# Patient Record
Sex: MALE | Race: WHITE | NOT HISPANIC OR LATINO | Employment: FULL TIME | URBAN - METROPOLITAN AREA
[De-identification: names, ages, dates, MRNs, and addresses within clinical notes are randomized per-mention and may not be internally consistent; named-entity substitution may affect disease eponyms.]

---

## 2021-04-08 DIAGNOSIS — Z23 ENCOUNTER FOR IMMUNIZATION: ICD-10-CM

## 2023-04-25 ENCOUNTER — APPOINTMENT (EMERGENCY)
Dept: CT IMAGING | Facility: HOSPITAL | Age: 53
End: 2023-04-25

## 2023-04-25 ENCOUNTER — HOSPITAL ENCOUNTER (INPATIENT)
Facility: HOSPITAL | Age: 53
LOS: 2 days | Discharge: HOME | End: 2023-04-27
Attending: EMERGENCY MEDICINE | Admitting: INTERNAL MEDICINE

## 2023-04-25 ENCOUNTER — APPOINTMENT (EMERGENCY)
Dept: RADIOLOGY | Facility: HOSPITAL | Age: 53
End: 2023-04-25

## 2023-04-25 DIAGNOSIS — R77.8 ELEVATED TROPONIN: ICD-10-CM

## 2023-04-25 DIAGNOSIS — L02.212 CUTANEOUS ABSCESS OF BACK EXCLUDING BUTTOCKS: ICD-10-CM

## 2023-04-25 DIAGNOSIS — E87.1 HYPONATREMIA: ICD-10-CM

## 2023-04-25 DIAGNOSIS — L03.818 CELLULITIS OF OTHER SPECIFIED SITE: ICD-10-CM

## 2023-04-25 DIAGNOSIS — R65.20 SEPSIS WITH ACUTE ORGAN DYSFUNCTION WITHOUT SEPTIC SHOCK, DUE TO UNSPECIFIED ORGANISM, UNSPECIFIED TYPE (HCC): Primary | ICD-10-CM

## 2023-04-25 DIAGNOSIS — A41.9 SEPSIS WITH ACUTE ORGAN DYSFUNCTION WITHOUT SEPTIC SHOCK, DUE TO UNSPECIFIED ORGANISM, UNSPECIFIED TYPE (HCC): Primary | ICD-10-CM

## 2023-04-25 PROBLEM — R79.89 ELEVATED TROPONIN: Status: ACTIVE | Noted: 2023-04-25

## 2023-04-25 PROBLEM — R50.9 FEVER: Status: ACTIVE | Noted: 2023-04-25

## 2023-04-25 PROBLEM — I10 ESSENTIAL HYPERTENSION: Status: ACTIVE | Noted: 2023-04-25

## 2023-04-25 LAB
4HR DELTA HS TROPONIN: -160 NG/L
ALBUMIN SERPL BCP-MCNC: 4 G/DL (ref 3.5–5)
ALP SERPL-CCNC: 49 U/L (ref 34–104)
ALT SERPL W P-5'-P-CCNC: 32 U/L (ref 7–52)
ANION GAP SERPL CALCULATED.3IONS-SCNC: 9 MMOL/L (ref 4–13)
APTT PPP: 31 SECONDS (ref 23–37)
AST SERPL W P-5'-P-CCNC: 29 U/L (ref 13–39)
ATRIAL RATE: 108 BPM
ATRIAL RATE: 90 BPM
BACTERIA UR QL AUTO: NORMAL /HPF
BASOPHILS # BLD AUTO: 0.06 THOUSANDS/ΜL (ref 0–0.1)
BASOPHILS NFR BLD AUTO: 1 % (ref 0–1)
BILIRUB SERPL-MCNC: 0.38 MG/DL (ref 0.2–1)
BILIRUB UR QL STRIP: NEGATIVE
BUN SERPL-MCNC: 15 MG/DL (ref 5–25)
CALCIUM SERPL-MCNC: 9.7 MG/DL (ref 8.4–10.2)
CARDIAC TROPONIN I PNL SERPL HS: 305 NG/L
CARDIAC TROPONIN I PNL SERPL HS: 435 NG/L (ref 8–18)
CARDIAC TROPONIN I PNL SERPL HS: 465 NG/L
CHLORIDE SERPL-SCNC: 94 MMOL/L (ref 96–108)
CLARITY UR: CLEAR
CO2 SERPL-SCNC: 27 MMOL/L (ref 21–32)
COLOR UR: YELLOW
CREAT SERPL-MCNC: 0.99 MG/DL (ref 0.6–1.3)
EOSINOPHIL # BLD AUTO: 0.05 THOUSAND/ΜL (ref 0–0.61)
EOSINOPHIL NFR BLD AUTO: 1 % (ref 0–6)
ERYTHROCYTE [DISTWIDTH] IN BLOOD BY AUTOMATED COUNT: 12.9 % (ref 11.6–15.1)
FLUAV RNA RESP QL NAA+PROBE: NEGATIVE
FLUBV RNA RESP QL NAA+PROBE: NEGATIVE
GFR SERPL CREATININE-BSD FRML MDRD: 86 ML/MIN/1.73SQ M
GLUCOSE SERPL-MCNC: 112 MG/DL (ref 65–140)
GLUCOSE UR STRIP-MCNC: NEGATIVE MG/DL
HCT VFR BLD AUTO: 42.1 % (ref 36.5–49.3)
HGB BLD-MCNC: 14.4 G/DL (ref 12–17)
HGB UR QL STRIP.AUTO: ABNORMAL
IMM GRANULOCYTES # BLD AUTO: 0.06 THOUSAND/UL (ref 0–0.2)
IMM GRANULOCYTES NFR BLD AUTO: 1 % (ref 0–2)
INR PPP: 1.25 (ref 0.84–1.19)
KETONES UR STRIP-MCNC: NEGATIVE MG/DL
LACTATE SERPL-SCNC: 0.8 MMOL/L (ref 0.5–2)
LEUKOCYTE ESTERASE UR QL STRIP: NEGATIVE
LYMPHOCYTES # BLD AUTO: 1.32 THOUSANDS/ΜL (ref 0.6–4.47)
LYMPHOCYTES NFR BLD AUTO: 13 % (ref 14–44)
MCH RBC QN AUTO: 30.2 PG (ref 26.8–34.3)
MCHC RBC AUTO-ENTMCNC: 34.2 G/DL (ref 31.4–37.4)
MCV RBC AUTO: 88 FL (ref 82–98)
MONOCYTES # BLD AUTO: 1.48 THOUSAND/ΜL (ref 0.17–1.22)
MONOCYTES NFR BLD AUTO: 15 % (ref 4–12)
NEUTROPHILS # BLD AUTO: 7.15 THOUSANDS/ΜL (ref 1.85–7.62)
NEUTS SEG NFR BLD AUTO: 69 % (ref 43–75)
NITRITE UR QL STRIP: NEGATIVE
NON-SQ EPI CELLS URNS QL MICRO: NORMAL /HPF
NRBC BLD AUTO-RTO: 0 /100 WBCS
P AXIS: 48 DEGREES
P AXIS: 54 DEGREES
PH UR STRIP.AUTO: 5.5 [PH]
PLATELET # BLD AUTO: 311 THOUSANDS/UL (ref 149–390)
PMV BLD AUTO: 9.2 FL (ref 8.9–12.7)
POTASSIUM SERPL-SCNC: 3.5 MMOL/L (ref 3.5–5.3)
PR INTERVAL: 156 MS
PR INTERVAL: 161 MS
PROCALCITONIN SERPL-MCNC: 0.53 NG/ML
PROT SERPL-MCNC: 7.7 G/DL (ref 6.4–8.4)
PROT UR STRIP-MCNC: NEGATIVE MG/DL
PROTHROMBIN TIME: 16 SECONDS (ref 11.6–14.5)
QRS AXIS: 14 DEGREES
QRS AXIS: 25 DEGREES
QRSD INTERVAL: 76 MS
QRSD INTERVAL: 85 MS
QT INTERVAL: 296 MS
QT INTERVAL: 334 MS
QTC INTERVAL: 396 MS
QTC INTERVAL: 409 MS
RBC # BLD AUTO: 4.77 MILLION/UL (ref 3.88–5.62)
RBC #/AREA URNS AUTO: NORMAL /HPF
RSV RNA RESP QL NAA+PROBE: NEGATIVE
SARS-COV-2 RNA RESP QL NAA+PROBE: NEGATIVE
SODIUM SERPL-SCNC: 130 MMOL/L (ref 135–147)
SP GR UR STRIP.AUTO: 1.02 (ref 1–1.03)
T WAVE AXIS: 17 DEGREES
T WAVE AXIS: 39 DEGREES
UROBILINOGEN UR QL STRIP.AUTO: 0.2 E.U./DL
VENTRICULAR RATE: 108 BPM
VENTRICULAR RATE: 90 BPM
WBC # BLD AUTO: 10.12 THOUSAND/UL (ref 4.31–10.16)
WBC #/AREA URNS AUTO: NORMAL /HPF

## 2023-04-25 RX ORDER — METHOCARBAMOL 500 MG/1
500 TABLET, FILM COATED ORAL EVERY 6 HOURS PRN
Status: DISCONTINUED | OUTPATIENT
Start: 2023-04-25 | End: 2023-04-27 | Stop reason: HOSPADM

## 2023-04-25 RX ORDER — HYDROCHLOROTHIAZIDE 25 MG/1
25 TABLET ORAL DAILY
COMMUNITY

## 2023-04-25 RX ORDER — METOPROLOL SUCCINATE 50 MG/1
50 TABLET, EXTENDED RELEASE ORAL DAILY
Status: DISCONTINUED | OUTPATIENT
Start: 2023-04-26 | End: 2023-04-27 | Stop reason: HOSPADM

## 2023-04-25 RX ORDER — HEPARIN SODIUM 5000 [USP'U]/ML
5000 INJECTION, SOLUTION INTRAVENOUS; SUBCUTANEOUS EVERY 8 HOURS SCHEDULED
Status: DISCONTINUED | OUTPATIENT
Start: 2023-04-25 | End: 2023-04-27 | Stop reason: HOSPADM

## 2023-04-25 RX ORDER — WATER 1000 ML/1000ML
INJECTION, SOLUTION INTRAVENOUS
Status: COMPLETED
Start: 2023-04-25 | End: 2023-04-25

## 2023-04-25 RX ORDER — KETOROLAC TROMETHAMINE 30 MG/ML
15 INJECTION, SOLUTION INTRAMUSCULAR; INTRAVENOUS ONCE
Status: DISCONTINUED | OUTPATIENT
Start: 2023-04-25 | End: 2023-04-25

## 2023-04-25 RX ORDER — METOPROLOL SUCCINATE 50 MG/1
50 TABLET, EXTENDED RELEASE ORAL DAILY
COMMUNITY

## 2023-04-25 RX ORDER — ACETAMINOPHEN 325 MG/1
650 TABLET ORAL EVERY 6 HOURS PRN
Status: DISCONTINUED | OUTPATIENT
Start: 2023-04-25 | End: 2023-04-27 | Stop reason: HOSPADM

## 2023-04-25 RX ORDER — CEFTRIAXONE 1 G/50ML
1000 INJECTION, SOLUTION INTRAVENOUS ONCE
Status: COMPLETED | OUTPATIENT
Start: 2023-04-25 | End: 2023-04-25

## 2023-04-25 RX ORDER — HYDROCHLOROTHIAZIDE 25 MG/1
25 TABLET ORAL DAILY
Status: DISCONTINUED | OUTPATIENT
Start: 2023-04-26 | End: 2023-04-27 | Stop reason: HOSPADM

## 2023-04-25 RX ORDER — CEFTRIAXONE 2 G/50ML
2000 INJECTION, SOLUTION INTRAVENOUS EVERY 12 HOURS
Status: DISCONTINUED | OUTPATIENT
Start: 2023-04-25 | End: 2023-04-26

## 2023-04-25 RX ORDER — ACETAMINOPHEN 325 MG/1
975 TABLET ORAL ONCE
Status: COMPLETED | OUTPATIENT
Start: 2023-04-25 | End: 2023-04-25

## 2023-04-25 RX ORDER — KETOROLAC TROMETHAMINE 30 MG/ML
15 INJECTION, SOLUTION INTRAMUSCULAR; INTRAVENOUS ONCE
Status: COMPLETED | OUTPATIENT
Start: 2023-04-25 | End: 2023-04-25

## 2023-04-25 RX ADMIN — VANCOMYCIN HYDROCHLORIDE 2000 MG: 1 INJECTION, POWDER, LYOPHILIZED, FOR SOLUTION INTRAVENOUS at 17:08

## 2023-04-25 RX ADMIN — ACETAMINOPHEN 650 MG: 325 TABLET ORAL at 23:42

## 2023-04-25 RX ADMIN — IOHEXOL 100 ML: 350 INJECTION, SOLUTION INTRAVENOUS at 16:08

## 2023-04-25 RX ADMIN — ACETAMINOPHEN 975 MG: 325 TABLET ORAL at 14:14

## 2023-04-25 RX ADMIN — SODIUM CHLORIDE 1000 ML: 0.9 INJECTION, SOLUTION INTRAVENOUS at 14:04

## 2023-04-25 RX ADMIN — KETOROLAC TROMETHAMINE 15 MG: 30 INJECTION, SOLUTION INTRAMUSCULAR; INTRAVENOUS at 17:42

## 2023-04-25 RX ADMIN — WATER 10 ML: 1 INJECTION INTRAMUSCULAR; INTRAVENOUS; SUBCUTANEOUS at 23:43

## 2023-04-25 RX ADMIN — CEFTRIAXONE 1000 MG: 1 INJECTION, SOLUTION INTRAVENOUS at 16:00

## 2023-04-25 RX ADMIN — SODIUM CHLORIDE 1000 ML: 0.9 INJECTION, SOLUTION INTRAVENOUS at 15:13

## 2023-04-25 RX ADMIN — AMPICILLIN SODIUM 2000 MG: 2 INJECTION, POWDER, FOR SOLUTION INTRAMUSCULAR; INTRAVENOUS at 23:43

## 2023-04-25 NOTE — ED PROVIDER NOTES
History  Chief Complaint   Patient presents with   • Fever - 9 weeks to 74 years     Pt noticed an abscess on his back 4/8 and was prescribed Bactrim  [facility] and on 4/21 they drained it  Pt has had a fever since 4/21  PTA denies taking tylenol or motrin  This is a 23-year-old male with past medical history significant for hypertension presenting to the emergency department today for a fever ongoing for approximately 2 weeks  The patient notes that he had an abscess towards his spine that he has seen in urgent care facility for numerous times  He completed an entire course of Bactrim in addition to an incision and drainage and notes that the abscess has been improving  Though the abscess has been improving, the patient has still had a fever  The patient notes generalized myalgias associated with this  He denies any chest pain or shortness of breath  He has no dizziness, lightheadedness, or visual disturbances  He has no headache or neck pain  He denies any dysuria, hematuria, or foul-smelling urine  He does note a recent fall resulting in low back pain but notes this has been ongoing since prior to the fever  The patient does not use intravenous drugs  He denies any numbness or tingling  He does have a history of MRSA  The patient has no URI symptoms  The patient denies other complaints at this time  History provided by:  Patient   used: No    Fever - 9 weeks to 74 years  Temp source:  Oral  Severity:  Moderate  Onset quality:  Gradual  Duration: numerous weeks    Timing:  Intermittent  Progression:  Waxing and waning  Chronicity:  New  Worsened by:  Nothing  Ineffective treatments:  None tried  Associated symptoms: no chest pain, no chills, no confusion, no congestion, no cough, no diarrhea, no dysuria, no ear pain, no headaches, no myalgias, no nausea, no rash, no rhinorrhea, no somnolence, no sore throat and no vomiting Risk factors: no sick contacts        Prior to Admission Medications   Prescriptions Last Dose Informant Patient Reported? Taking?   hydrochlorothiazide (HYDRODIURIL) 25 mg tablet 4/25/2023  Yes Yes   Sig: Take 25 mg by mouth daily   metoprolol succinate (TOPROL-XL) 50 mg 24 hr tablet 4/25/2023  Yes Yes   Sig: Take 50 mg by mouth daily      Facility-Administered Medications: None       Past Medical History:   Diagnosis Date   • Hypertension        History reviewed  No pertinent surgical history  History reviewed  No pertinent family history  I have reviewed and agree with the history as documented  E-Cigarette/Vaping     E-Cigarette/Vaping Substances     Social History     Tobacco Use   • Smoking status: Never   • Smokeless tobacco: Never   Substance Use Topics   • Alcohol use: Not Currently   • Drug use: Yes     Types: Marijuana       Review of Systems   Constitutional: Positive for fever  Negative for appetite change, chills and diaphoresis  HENT: Negative for congestion, ear pain, rhinorrhea and sore throat  Respiratory: Negative for cough, chest tightness, shortness of breath and wheezing  Cardiovascular: Negative for chest pain, palpitations and leg swelling  Gastrointestinal: Negative for abdominal pain, constipation, diarrhea, nausea and vomiting  Genitourinary: Negative for dysuria  Musculoskeletal: Negative for myalgias, neck pain and neck stiffness  Skin: Negative for rash and wound  Neurological: Negative for dizziness, seizures, light-headedness, numbness and headaches  Psychiatric/Behavioral: Negative for confusion  All other systems reviewed and are negative  Physical Exam  Physical Exam  Vitals and nursing note reviewed  Constitutional:       General: He is not in acute distress  Appearance: Normal appearance  He is normal weight  He is not ill-appearing, toxic-appearing or diaphoretic  HENT:      Head: Normocephalic and atraumatic        Nose: Nose normal  No congestion or rhinorrhea  Mouth/Throat:      Mouth: Mucous membranes are moist       Pharynx: No oropharyngeal exudate or posterior oropharyngeal erythema  Eyes:      General: No scleral icterus  Right eye: No discharge  Left eye: No discharge  Conjunctiva/sclera: Conjunctivae normal    Neck:      Comments: Negative Kernig  Negative Brudzinski  Cardiovascular:      Rate and Rhythm: Normal rate and regular rhythm  Pulses: Normal pulses  Heart sounds: Normal heart sounds  No murmur heard  No friction rub  No gallop  Pulmonary:      Effort: Pulmonary effort is normal  No respiratory distress  Breath sounds: Normal breath sounds  No stridor  No wheezing, rhonchi or rales  Comments: Clear to auscultation bilaterally without adventitious breath sounds  Chest:      Chest wall: No tenderness  Abdominal:      General: Abdomen is flat  There is no distension  Palpations: Abdomen is soft  Tenderness: There is no abdominal tenderness  There is no right CVA tenderness, left CVA tenderness, guarding or rebound  Comments: The abdomen is soft, nontender, nondistended, and without organomegaly; the patient is nonperitoneal   Musculoskeletal:         General: Normal range of motion  Cervical back: Normal range of motion  No rigidity  Right lower leg: No edema  Left lower leg: No edema  Skin:     General: Skin is warm and dry  Capillary Refill: Capillary refill takes less than 2 seconds  Coloration: Skin is not jaundiced or pale  Comments: The patient has a small area of redness and swelling located to the patient's midline thoracic spine; no active drainage; appears superficial   Neurological:      General: No focal deficit present  Mental Status: He is alert and oriented to person, place, and time  Mental status is at baseline     Psychiatric:         Mood and Affect: Mood normal          Behavior: Behavior normal  Vital Signs  ED Triage Vitals   Temperature Pulse Respirations Blood Pressure SpO2   04/25/23 1331 04/25/23 1331 04/25/23 1331 04/25/23 1331 04/25/23 1331   (!) 101 9 °F (38 8 °C) (!) 115 16 144/86 96 %      Temp Source Heart Rate Source Patient Position - Orthostatic VS BP Location FiO2 (%)   04/25/23 1331 04/25/23 1427 04/25/23 1331 04/25/23 1331 --   Oral Monitor Lying Right arm       Pain Score       04/25/23 1331       No Pain           Vitals:    04/25/23 1513 04/25/23 1607 04/25/23 1629 04/25/23 1718   BP: 115/75 130/77 130/77 130/82   Pulse: 99 92 95 89   Patient Position - Orthostatic VS: Lying Sitting Sitting Sitting         Visual Acuity      ED Medications  Medications   vancomycin (VANCOCIN) 2,000 mg in sodium chloride 0 9 % 500 mL IVPB (2,000 mg Intravenous New Bag 4/25/23 1708)   sodium chloride 0 9 % bolus 1,000 mL (0 mL Intravenous Stopped 4/25/23 1512)   acetaminophen (TYLENOL) tablet 975 mg (975 mg Oral Given 4/25/23 1414)   sodium chloride 0 9 % bolus 1,000 mL (0 mL Intravenous Stopped 4/25/23 1637)   cefTRIAXone (ROCEPHIN) IVPB (premix in dextrose) 1,000 mg 50 mL (0 mg Intravenous Stopped 4/25/23 1637)   iohexol (OMNIPAQUE) 350 MG/ML injection (SINGLE-DOSE) 100 mL (100 mL Intravenous Given 4/25/23 1608)   ketorolac (TORADOL) injection 15 mg (15 mg Intravenous Given 4/25/23 1742)       Diagnostic Studies  Results Reviewed     Procedure Component Value Units Date/Time    High Sensitivity Troponin I Random [[id number]]  (Abnormal) Collected: 04/25/23 1600    Lab Status: Final result Specimen: Blood from Hand, Left Updated: 04/25/23 1636     HS TnI random 435 ng/L     Blood culture #2 [[id number]] Collected: 04/25/23 1600    Lab Status:  In process Specimen: Blood from Hand, Left Updated: 04/25/23 1610    HS Troponin I 4hr [[id number]]     Lab Status: No result Specimen: Blood     Urine Microscopic [[id number]]  (Normal) Collected: 04/25/23 1535    Lab Status: Final result Specimen: Urine, Clean Catch Updated: 04/25/23 1558     RBC, UA 0-1 /hpf      WBC, UA None Seen /hpf      Epithelial Cells Occasional /hpf      Bacteria, UA Occasional /hpf     UA w Reflex to Microscopic w Reflex to Culture [[id number]]  (Abnormal) Collected: 04/25/23 1535    Lab Status: Final result Specimen: Urine, Clean Catch Updated: 04/25/23 1542     Color, UA Yellow     Clarity, UA Clear     Specific Gravity, UA 1 025     pH, UA 5 5     Leukocytes, UA Negative     Nitrite, UA Negative     Protein, UA Negative mg/dl      Glucose, UA Negative mg/dl      Ketones, UA Negative mg/dl      Urobilinogen, UA 0 2 E U /dl      Bilirubin, UA Negative     Occult Blood, UA 2+    FLU/RSV/COVID - if FLU/RSV clinically relevant [[id number]]  (Normal) Collected: 04/25/23 1422    Lab Status: Final result Specimen: Nares from Nose Updated: 04/25/23 1526     SARS-CoV-2 Negative     INFLUENZA A PCR Negative     INFLUENZA B PCR Negative     RSV PCR Negative    Narrative:      FOR PEDIATRIC PATIENTS - copy/paste COVID Guidelines URL to browser: [email or web address]  [email or web address]    SARS-CoV-2 assay is a Nucleic Acid Amplification assay intended for the  qualitative detection of nucleic acid from SARS-CoV-2 in nasopharyngeal  swabs  Results are for the presumptive identification of SARS-CoV-2 RNA  Positive results are indicative of infection with SARS-CoV-2, the virus  causing COVID-19, but do not rule out bacterial infection or co-infection  with other viruses  Laboratories within the [place] and its  territories are required to report all positive results to the appropriate  public health authorities  Negative results do not preclude SARS-CoV-2  infection and should not be used as the sole basis for treatment or other  patient management decisions  Negative results must be combined with  clinical observations, patient history, and epidemiological information    This test has not been FDA cleared or approved  This test has been authorized by FDA under an Emergency Use Authorization  (EUA)  This test is only authorized for the duration of time the  declaration that circumstances exist justifying the authorization of the  emergency use of an in vitro diagnostic tests for detection of SARS-CoV-2  virus and/or diagnosis of COVID-19 infection under section 564(b)(1) of  the Act, 21 U  S C  520LLE-6(X)(6), unless the authorization is terminated  or revoked sooner  The test has been validated but independent review by FDA  and CLIA is pending  Test performed using [employer] GeneXpert: This RT-PCR assay targets N2,  a region unique to SARS-CoV-2  A conserved region in the E-gene was chosen  for pan-Sarbecovirus detection which includes SARS-CoV-2  According to CMS-2020-01-R, this platform meets the definition of high-throughput technology  HS Troponin 0hr (reflex protocol) [[id number]]  (Abnormal) Collected: 04/25/23 1424    Lab Status: Final result Specimen: Blood from Arm, Left Updated: 04/25/23 1454     hs TnI 0hr 465 ng/L     Protime-INR [[id number]]  (Abnormal) Collected: 04/25/23 1405    Lab Status: Final result Specimen: Blood from Arm, Left Updated: 04/25/23 1446     Protime 16 0 seconds      INR 1 25    APTT [[id number]]  (Normal) Collected: 04/25/23 1405    Lab Status: Final result Specimen: Blood from Arm, Left Updated: 04/25/23 1446     PTT 31 seconds     Procalcitonin [[id number]]  (Abnormal) Collected: 04/25/23 1405    Lab Status: Final result Specimen: Blood from Arm, Left Updated: 04/25/23 1439     Procalcitonin 0 53 ng/ml     Lactic acid [[id number]]  (Normal) Collected: 04/25/23 1405    Lab Status: Final result Specimen: Blood from Arm, Left Updated: 04/25/23 1434     LACTIC ACID 0 8 mmol/L     Narrative:      Result may be elevated if tourniquet was used during collection      Comprehensive metabolic panel [[id number]]  (Abnormal) Collected: 04/25/23 1405    Lab Status: Final result Specimen: Blood from Arm, Left Updated: 04/25/23 1431     Sodium 130 mmol/L      Potassium 3 5 mmol/L      Chloride 94 mmol/L      CO2 27 mmol/L      ANION GAP 9 mmol/L      BUN 15 mg/dL      Creatinine 0 99 mg/dL      Glucose 112 mg/dL      Calcium 9 7 mg/dL      AST 29 U/L      ALT 32 U/L      Alkaline Phosphatase 49 U/L      Total Protein 7 7 g/dL      Albumin 4 0 g/dL      Total Bilirubin 0 38 mg/dL      eGFR 86 ml/min/1 73sq m     Narrative:      National Kidney Disease Foundation guidelines for Chronic Kidney Disease (CKD):   •  Stage 1 with normal or high GFR (GFR > 90 mL/min/1 73 square meters)  •  Stage 2 Mild CKD (GFR = 60-89 mL/min/1 73 square meters)  •  Stage 3A Moderate CKD (GFR = 45-59 mL/min/1 73 square meters)  •  Stage 3B Moderate CKD (GFR = 30-44 mL/min/1 73 square meters)  •  Stage 4 Severe CKD (GFR = 15-29 mL/min/1 73 square meters)  •  Stage 5 End Stage CKD (GFR <15 mL/min/1 73 square meters)  Note: GFR calculation is accurate only with a steady state creatinine    CBC and differential [[id number]]  (Abnormal) Collected: 04/25/23 1405    Lab Status: Final result Specimen: Blood from Arm, Left Updated: 04/25/23 1412     WBC 10 12 Thousand/uL      RBC 4 77 Million/uL      Hemoglobin 14 4 g/dL      Hematocrit 42 1 %      MCV 88 fL      MCH 30 2 pg      MCHC 34 2 g/dL      RDW 12 9 %      MPV 9 2 fL      Platelets 548 Thousands/uL      nRBC 0 /100 WBCs      Neutrophils Relative 69 %      Immat GRANS % 1 %      Lymphocytes Relative 13 %      Monocytes Relative 15 %      Eosinophils Relative 1 %      Basophils Relative 1 %      Neutrophils Absolute 7 15 Thousands/µL      Immature Grans Absolute 0 06 Thousand/uL      Lymphocytes Absolute 1 32 Thousands/µL      Monocytes Absolute 1 48 Thousand/µL      Eosinophils Absolute 0 05 Thousand/µL      Basophils Absolute 0 06 Thousands/µL     Blood culture #1 [[id number]] Collected: 04/25/23 1406    Lab Status:  In process Specimen: Blood from Arm, Left Updated: 04/25/23 1409 CT spine thoracic w contrast   Final Result by Leonarda Box MD (04/25 7929)      Superior aspect of T1 vertebral body was not not fully included in the field-of-view  - No acute osseous abnormality of thoracic spine    - Tiny cutaneous abscess in midline T10 level with mild adjacent subcutaneous cellulitis  Recommend direct visualization for further evaluation    - Chronic L2 superior endplate compression fracture with mild height loss  - Additional chronic/incidental findings as detailed above  The study was marked in [facility] for immediate notification  Workstation performed: [id number]         XR chest portable    (Results Pending)              Procedures  ECG 12 Lead Documentation Only    Date/Time: 4/25/2023 2:13 PM  Performed by: Pamella Rogel PA-C  Authorized by: Pamella Rogel PA-C     Indications / Diagnosis:  Tachycardia  Patient location:  ED  Previous ECG:     Previous ECG:  Unavailable  Interpretation:     Interpretation: non-specific    Rate:     ECG rate:  108    ECG rate assessment: tachycardic    Rhythm:     Rhythm: sinus tachycardia    Ectopy:     Ectopy: none    QRS:     QRS axis:  Normal  ST segments:     ST segments:  Normal  T waves:     T waves: non-specific    Comments:      Sinus tachycardia with a rate of 108 without any evidence of STEMI  Nonspecific ST segment changes  Normal axis  No ectopy    ECG 12 Lead Documentation Only    Date/Time: 4/25/2023 4:05 PM  Performed by: Pamella Rogel PA-C  Authorized by: Pamella Rogel PA-C     Indications / Diagnosis:  Delta EKG  Patient location:  ED  Previous ECG:     Previous ECG:  Compared to current    Comparison ECG info:  4/25/2023    Similarity:  Changes noted  Interpretation:     Interpretation: normal    Rate:     ECG rate:  90    ECG rate assessment: normal    Rhythm:     Rhythm: sinus rhythm    Ectopy:     Ectopy: none    QRS:     QRS axis:  Normal  Conduction:     Conduction: normal    ST segments:     ST segments:  Normal  T waves:     T waves: non-specific    Comments:      Normal sinus rhythm with a rate of 90 without any ST segment changes or signs of ischemia  Normal axis  No ectopy  Nonspecific T wave inversions  ED Course  ED Course as of 04/25/23 1820   Tue Apr 25, 2023   1454 hs TnI 0hr(!): 465  Will discuss with cardiology  Will continue to monitor  1503 I discussed the case with Dr Yogi Gonzalez with cardiology  He reviewed the EKG  Appears to be early repolarization  Believes troponin might be elevated from sepsis  Recommends admission to trend troponins  Patient will require an echocardiogram   We will continue to monitor  1508 The 30ml/kg fluid bolus was not given to the patient despite hypotension and/or significantly elevated lactate of = 4 and/or presence of septic shock due to: Concern for fluid/volume overload  The patient will be administered 2L of crystalloid fluid instead  Orders for this have been placed in Epic  The patient may receive additional colloid or crystalloid fluids thereafter based on clinical condition  Horacio Ham PA-C   1637 HS TnI random(!): 435  Delta troponin calculates to -30                               SBIRT 20yo+    Flowsheet Row Most Recent Value   Initial Alcohol Screen: US AUDIT-C     1  How often do you have a drink containing alcohol? 0 Filed at: 04/25/2023 1331   2  How many drinks containing alcohol do you have on a typical day you are drinking? 0 Filed at: 04/25/2023 1331   3a  Male UNDER 65: How often do you have five or more drinks on one occasion? 0 Filed at: 04/25/2023 1331   3b  FEMALE Any Age, or MALE 65+: How often do you have 4 or more drinks on one occassion? 0 Filed at: 04/25/2023 1331   Audit-C Score 0 Filed at: 04/25/2023 1331   FRANCISCO JAVIER: How many times in the past year have you        Used an illegal drug or used a prescription medication for non-medical reasons? Never Filed at: 04/25/2023 2671                    Medical Decision Making  This is a 59-year-old male presenting to the emergency department today for an unremitting fever  The patient had an abscess drained a few weeks ago and has completed an entire course of Bactrim  He denies chest pain or shortness of breath  He denies any neck pain  Vital signs initially show fever and tachycardia  The patient meets SIRS criteria  On physical examination, the patient's abdomen is soft, nontender, nondistended, and without organomegaly  The patient's lungs are clear to auscultation bilaterally without adventitious breath sounds  The patient has a negative Kernig and Brudzinski; the patient's examination is nonmeningeal   The patient has a small area of superficial cellulitis located to his midline thoracic spine  The patient has no leukocytosis but does have an elevated procalcitonin  Initial EKG shows sinus tachycardia with a rate of 108  Initial troponin is 465  I discussed the case and shared the EKG with Dr Niki Rogel with cardiology; he believes it is likely in the setting of sepsis but does not appear to be a STEMI at this time  Mild hyponatremia of 130  Elevated procalcitonin  The patient was given 2 L of fluid in addition to Rocephin and vancomycin while here in the emergency department  Delta EKG shows normal sinus rhythm with a rate of 90  After 2 L of intravenous fluids, the patient's heart rate began to normalize  The patient was given antipyretics while here in the emergency department  CT thoracic spine with contrast shows tiny cutaneous abscess in the midline T10 level with mild adjacent subcutaneous cellulitis  Negative urine studies  Based upon sepsis without origin, will plan for admission for intravenous antibiotics  The patient was accepted to the service of Dr Nena Horan  CXR shows no acute cardiopulmonary disease on my independent interpretation    Strict "return precautions were given  Recommend PCP follow-up as soon as possible  The patient and/or patient's proxy verify their understanding and agree to the plan at this time  All questions answered to the patient and/or their proxy's satisfaction  All labs reviewed and utilized in the medical decision making process (if labs were ordered)  Portions of the record may have been created with voice recognition software   Occasional wrong word or \"sound a like\" substitutions may have occurred due to the inherent limitations of voice recognition software   Read the chart carefully and recognize, using context, where substitutions have occurred  Cellulitis of other specified site: complicated acute illness or injury  Hyponatremia: complicated acute illness or injury  Sepsis with acute organ dysfunction without septic shock, due to unspecified organism, unspecified type [facility]): complicated acute illness or injury  Amount and/or Complexity of Data Reviewed  Labs: ordered  Decision-making details documented in ED Course  Radiology: ordered  Decision-making details documented in ED Course  Details: CXR  ECG/medicine tests: ordered  Decision-making details documented in ED Course  Discussion of management or test interpretation with external provider(s): Dr Rama Cornejo - Cardiology  Dr Janneth Ramirez drugs  Prescription drug management  Decision regarding hospitalization            Disposition  Final diagnoses:   Sepsis with acute organ dysfunction without septic shock, due to unspecified organism, unspecified [facility] )   Hyponatremia   Cellulitis of other specified site     Time reflects when diagnosis was documented in both MDM as applicable and the Disposition within this note     Time User Action Codes Description Comment    4/25/2023  5:16 PM Bert Ham Add [A41 9,  O11 20] Sepsis with acute organ dysfunction without septic shock, due to unspecified organism, unspecified type ([facility] ) " 4/25/2023  5:16 PM Jailyn Ham Add [E87 1] Hyponatremia     4/25/2023  5:17 PM Jailyn Ham Add [J75 270] Cellulitis of other specified site       ED Disposition     ED Disposition   Admit    Condition   Stable    Date/Time   Tue Apr 25, 2023  5:16 PM    Comment   Case was discussed with Dr Nai Garcia and the patient's admission status was agreed to be Admission Status: inpatient status to the service of Dr Nai Garcia  Follow-up Information    None         Current Discharge Medication List      CONTINUE these medications which have NOT CHANGED    Details   hydrochlorothiazide (HYDRODIURIL) 25 mg tablet Take 25 mg by mouth daily      metoprolol succinate (TOPROL-XL) 50 mg 24 hr tablet Take 50 mg by mouth daily             No discharge procedures on file      PDMP Review     None          ED Provider  Electronically Signed by           Mandi Apodaca PA-C  04/25/23 9657

## 2023-04-26 ENCOUNTER — APPOINTMENT (INPATIENT)
Dept: NON INVASIVE DIAGNOSTICS | Facility: HOSPITAL | Age: 53
End: 2023-04-26

## 2023-04-26 PROBLEM — A41.9 SEPSIS (HCC): Status: ACTIVE | Noted: 2023-04-25

## 2023-04-26 LAB
ANION GAP SERPL CALCULATED.3IONS-SCNC: 9 MMOL/L (ref 4–13)
AORTIC ROOT: 3.3 CM
APICAL FOUR CHAMBER EJECTION FRACTION: 44 %
BASOPHILS # BLD AUTO: 0.03 THOUSANDS/ΜL (ref 0–0.1)
BASOPHILS NFR BLD AUTO: 0 % (ref 0–1)
BUN SERPL-MCNC: 13 MG/DL (ref 5–25)
CALCIUM SERPL-MCNC: 8.4 MG/DL (ref 8.4–10.2)
CHLORIDE SERPL-SCNC: 100 MMOL/L (ref 96–108)
CO2 SERPL-SCNC: 25 MMOL/L (ref 21–32)
CREAT SERPL-MCNC: 0.74 MG/DL (ref 0.6–1.3)
E WAVE DECELERATION TIME: 199 MS
EOSINOPHIL # BLD AUTO: 0.13 THOUSAND/ΜL (ref 0–0.61)
EOSINOPHIL NFR BLD AUTO: 2 % (ref 0–6)
ERYTHROCYTE [DISTWIDTH] IN BLOOD BY AUTOMATED COUNT: 12.9 % (ref 11.6–15.1)
FRACTIONAL SHORTENING: 35 (ref 28–44)
GFR SERPL CREATININE-BSD FRML MDRD: 105 ML/MIN/1.73SQ M
GLUCOSE SERPL-MCNC: 99 MG/DL (ref 65–140)
HCT VFR BLD AUTO: 38.2 % (ref 36.5–49.3)
HGB BLD-MCNC: 12.8 G/DL (ref 12–17)
IMM GRANULOCYTES # BLD AUTO: 0.03 THOUSAND/UL (ref 0–0.2)
IMM GRANULOCYTES NFR BLD AUTO: 0 % (ref 0–2)
INTERVENTRICULAR SEPTUM IN DIASTOLE (PARASTERNAL SHORT AXIS VIEW): 1.3 CM
INTERVENTRICULAR SEPTUM: 1.3 CM (ref 0.6–1.1)
LAAS-AP2: 19.1 CM2
LAAS-AP4: 16.9 CM2
LEFT ATRIUM SIZE: 3.7 CM
LEFT INTERNAL DIMENSION IN SYSTOLE: 2.8 CM (ref 2.1–4)
LEFT VENTRICLE DIASTOLIC VOLUME (MOD BIPLANE): 119 ML
LEFT VENTRICLE SYSTOLIC VOLUME (MOD BIPLANE): 58 ML
LEFT VENTRICULAR INTERNAL DIMENSION IN DIASTOLE: 4.3 CM (ref 3.5–6)
LEFT VENTRICULAR POSTERIOR WALL IN END DIASTOLE: 1.2 CM
LEFT VENTRICULAR STROKE VOLUME: 52 ML
LV EF: 51 %
LVSV (TEICH): 52 ML
LYMPHOCYTES # BLD AUTO: 1.46 THOUSANDS/ΜL (ref 0.6–4.47)
LYMPHOCYTES NFR BLD AUTO: 19 % (ref 14–44)
MCH RBC QN AUTO: 29.8 PG (ref 26.8–34.3)
MCHC RBC AUTO-ENTMCNC: 33.5 G/DL (ref 31.4–37.4)
MCV RBC AUTO: 89 FL (ref 82–98)
MONOCYTES # BLD AUTO: 1.4 THOUSAND/ΜL (ref 0.17–1.22)
MONOCYTES NFR BLD AUTO: 18 % (ref 4–12)
MV E'TISSUE VEL-SEP: 10 CM/S
MV PEAK A VEL: 0.92 M/S
MV PEAK E VEL: 73 CM/S
MV STENOSIS PRESSURE HALF TIME: 58 MS
MV VALVE AREA P 1/2 METHOD: 3.79
NEUTROPHILS # BLD AUTO: 4.72 THOUSANDS/ΜL (ref 1.85–7.62)
NEUTS SEG NFR BLD AUTO: 61 % (ref 43–75)
NRBC BLD AUTO-RTO: 0 /100 WBCS
PLATELET # BLD AUTO: 265 THOUSANDS/UL (ref 149–390)
PMV BLD AUTO: 8.9 FL (ref 8.9–12.7)
POTASSIUM SERPL-SCNC: 3.5 MMOL/L (ref 3.5–5.3)
RBC # BLD AUTO: 4.29 MILLION/UL (ref 3.88–5.62)
RIGHT ATRIUM AREA SYSTOLE A4C: 12.9 CM2
RIGHT VENTRICLE ID DIMENSION: 3.3 CM
SL CV LEFT ATRIUM LENGTH A2C: 5.1 CM
SL CV LV EF: 55
SL CV PED ECHO LEFT VENTRICLE DIASTOLIC VOLUME (MOD BIPLANE) 2D: 82 ML
SL CV PED ECHO LEFT VENTRICLE SYSTOLIC VOLUME (MOD BIPLANE) 2D: 30 ML
SODIUM SERPL-SCNC: 134 MMOL/L (ref 135–147)
TRICUSPID ANNULAR PLANE SYSTOLIC EXCURSION: 1.8 CM
WBC # BLD AUTO: 7.77 THOUSAND/UL (ref 4.31–10.16)

## 2023-04-26 RX ORDER — WATER 1000 ML/1000ML
INJECTION, SOLUTION INTRAVENOUS
Status: COMPLETED
Start: 2023-04-26 | End: 2023-04-26

## 2023-04-26 RX ORDER — VANCOMYCIN HYDROCHLORIDE 1 G/200ML
1000 INJECTION, SOLUTION INTRAVENOUS EVERY 8 HOURS
Status: DISCONTINUED | OUTPATIENT
Start: 2023-04-26 | End: 2023-04-27

## 2023-04-26 RX ADMIN — VANCOMYCIN HYDROCHLORIDE 750 MG: 750 INJECTION, SOLUTION INTRAVENOUS at 12:56

## 2023-04-26 RX ADMIN — METOPROLOL SUCCINATE 50 MG: 50 TABLET, EXTENDED RELEASE ORAL at 08:07

## 2023-04-26 RX ADMIN — AMPICILLIN SODIUM 2000 MG: 2 INJECTION, POWDER, FOR SOLUTION INTRAMUSCULAR; INTRAVENOUS at 08:07

## 2023-04-26 RX ADMIN — CEFTRIAXONE 2000 MG: 2 INJECTION, SOLUTION INTRAVENOUS at 04:38

## 2023-04-26 RX ADMIN — VANCOMYCIN HYDROCHLORIDE 750 MG: 750 INJECTION, SOLUTION INTRAVENOUS at 05:42

## 2023-04-26 RX ADMIN — VANCOMYCIN HYDROCHLORIDE 1000 MG: 1 INJECTION, SOLUTION INTRAVENOUS at 20:02

## 2023-04-26 RX ADMIN — AMPICILLIN SODIUM 2000 MG: 2 INJECTION, POWDER, FOR SOLUTION INTRAMUSCULAR; INTRAVENOUS at 11:44

## 2023-04-26 RX ADMIN — WATER 10 ML: 1 INJECTION INTRAMUSCULAR; INTRAVENOUS; SUBCUTANEOUS at 03:49

## 2023-04-26 RX ADMIN — AMPICILLIN SODIUM 2000 MG: 2 INJECTION, POWDER, FOR SOLUTION INTRAMUSCULAR; INTRAVENOUS at 03:50

## 2023-04-26 RX ADMIN — HYDROCHLOROTHIAZIDE 25 MG: 25 TABLET ORAL at 08:07

## 2023-04-26 NOTE — CONSULTS
Consultation - Infectious Disease   Francisco Fiore  47 y o  male MRN: [id number]  Unit/Bed#: -01 Encounter: [id number]      IMPRESSION & RECOMMENDATIONS:   1  SIRS vs Sepsis, POA with fever and tachycardia  In setting of patient developing nightly fever/night sweats starting 4/20 on D4/5 of Bactrim for #2  Patient discontinued Bactrim 4/23/23  Admission blood cultures pending  Low grade temp/less sweats over night   -antibiotics as below  -follow-up blood cultures  -monitor temperature and hemodynamics  -serial exam  -recheck CBC and CMP in a m  2  MRSA Thoracic back abscess  S/p lidocaine injection followed by spontaneous drainage 4/21/23 at [facility] with wound culture MRSA sensitive to tetracycline  H/O MRSA boils 2022  Interested in decolonization regimen  No tick exposure  Lives in [place], visiting mom here in [place] since [facility]  4/25/23 CT thoracic spine with tiny cutaneous abscess at T10 level with mild adjacent subc cellulitis  -continue IV Vancomycin pending blood cultures  -Vancomycin dosed per pharmacy  -anticipate transition to Doxycycline to complete 1 week course from spontaneous drainage opening  -serial exam  -if loses IV access, can transition to Doxycycline     3  MRSA boils recurrence since 2022  MRSA Colonization  Risk factor for recurrent infection  Recommend protocol as noted below as outpatient:  -patient interested in MRSA decolonization regimen upon discharge  1  Recommend applying mupirocin to anterior nares twice daily x 5 days   2  Recommend use of hibiclens bath every other day x 1-2 weeks and then decrease to twice weekly   3  Recommend use of bleach solution to wipe down bathroom/kitchen surfaces twice the first week and then weekly for 1 month  4  Recommend laundering bedding and clothing  5  Recommend not shaving skin in armpit, groin or legs and if he must shave, to first wash with Hibiclens and then use a fresh disposable razor for each shaving  6   Recommend hygiene measures including keeping fingernails cut short and clean; changing underwear, sleep wear, washcloths, and towels daily    4  Possible Bactrim Drug fever  Monitor on Vancomycin  Has tolerated Doxycycline for #3 prior  -avoid Bactrim  -monitor antibiotic tolerance    Antibiotics:  Vancomycin D2    I have discussed the above management plan in detail with patient, RN, [facility] RN, and Dr Hodan Funez of the primary service  All of questions answered and reassurance given  Extensive review of the medical records in epic including review of the notes, radiographs, and laboratory results     HISTORY OF PRESENT ILLNESS:  Reason for Consult: 1  Sepsis 2  Fever on Bactrim    HPI: Pako Rosales  is a 47y o  year old male with HTN, MRSA recurrent boils who presents with persistent fevers  He went to [facility] 4/16 for back abscess and was prescribed Bactrim  On 4/20 he started with fever/night sweats daily  Then at [facility] return visit s/p lidocaine injection followed by spontaneous drainage 4/21/23  Fevers continued and he discontinued Bactrim after 4/23/23 doses  He then presented 4/25/23 with fever and associated headache with photophobia or phonophobia  In the ED, patient with fever and tachycardia  Blood cultures obtained  Patient started on IV vancomycin and Rocephin and given fluid bolus with improvement  Underwent CT of the thoracic spine revealing tiny cutaneous abscess in the midline T10 level with mild adjacent subcutaneous cellulitis  Patient was admitted on IV antibiotics and further evaluation by Infectious disease for evaluation and management of Sepsis, fever on Bactrim  He is feeling much better  At [facility] wound culture has now grown MRSA sensitive to tetracycline  H/O MRSA boils 2022  He denies no neck pain, shortness of breath, abdominal pain, nausea/vomiting/diarrhea, urinary complaints  No other lesions on the skin  No tick exposure   Lives in [place], visiting mom here in [place] since [facility]              REVIEW OF SYSTEMS:  A complete review of systems is negative other than that noted in the HPI  PAST MEDICAL HISTORY:  Past Medical History:   Diagnosis Date   • Hypertension      History reviewed  No pertinent surgical history  FAMILY HISTORY:  Non-contributory    SOCIAL HISTORY:  Social History   Social History     Substance and Sexual Activity   Alcohol Use Not Currently     Social History     Substance and Sexual Activity   Drug Use Yes   • Types: Marijuana     Social History     Tobacco Use   Smoking Status Never   Smokeless Tobacco Never       ALLERGIES:  No Known Allergies    MEDICATIONS:  All current active medications have been reviewed  PHYSICAL EXAM:  Temp:  [97 3 °F (36 3 °C)-101 9 °F (38 8 °C)] 99 2 °F (37 3 °C)  HR:  [] 94  Resp:  [16-20] 20  BP: (106-144)/(67-92) 117/74  SpO2:  [94 %-99 %] 95 %  Temp (24hrs), Av 3 °F (37 4 °C), Min:97 3 °F (36 3 °C), Max:101 9 °F (38 8 °C)  Current: Temperature: 99 2 °F (37 3 °C)    Intake/Output Summary (Last 24 hours) at 2023 1303  Last data filed at 2023 0100  Gross per 24 hour   Intake 473 ml   Output --   Net 473 ml       General Appearance:  47year old male, ambulatory in room, feeling much better, appearing well, nontoxic, and in no distress   Head:  Normocephalic, without obvious abnormality, atraumatic   Eyes:  Conjunctiva pink and sclera anicteric, both eyes   Nose: Nares normal, mucosa normal, no drainage   Throat: Oropharynx moist without lesions   Neck: Supple, symmetrical, no adenopathy, no tenderness/mass/nodules, no nuchal rigidity    Back:   Symmetric, no curvature, ROM normal, no CVA tenderness   Lungs:   Clear to auscultation bilaterally, respirations unlabored   Chest Wall:  No tenderness or deformity   Heart:  RRR; no murmur, rub or gallop   Abdomen:   Soft, non-tender, protuberant, positive bowel sounds    Extremities: No cyanosis, clubbing or edema   Skin: No rashes or lesions   No draining wounds noted  Lymph nodes: Cervical, supraclavicular nodes normal   Neurologic: Alert and oriented times 3, extremity strength 5/5 and symmetric       LABS, IMAGING, & OTHER STUDIES:  Lab Results:  I have personally reviewed pertinent labs  Results from last 7 days   Lab Units 04/26/23  0506 04/25/23  1405   WBC Thousand/uL 7 77 10 12   HEMOGLOBIN g/dL 12 8 14 4   PLATELETS Thousands/uL 265 311     Results from last 7 days   Lab Units 04/26/23  0506 04/25/23  1405   SODIUM mmol/L 134* 130*   POTASSIUM mmol/L 3 5 3 5   CHLORIDE mmol/L 100 94*   CO2 mmol/L 25 27   BUN mg/dL 13 15   CREATININE mg/dL 0 74 0 99   EGFR ml/min/1 73sq m 105 86   CALCIUM mg/dL 8 4 9 7   AST U/L  --  29   ALT U/L  --  32   ALK PHOS U/L  --  49     Results from last 7 days   Lab Units 04/25/23  1600 04/25/23  1406   BLOOD CULTURE  Received in Microbiology Lab  Culture in Progress  Received in Microbiology Lab  Culture in Progress  Results from last 7 days   Lab Units 04/25/23  1405   PROCALCITONIN ng/ml 0 53*                   Imaging Studies:   Images in PACS reviewed personally  4/25/23 CT thoracic spine with tiny cutaneous abscess at T10 level with mild adjacent subc cellulitis  Other Studies:   I have personally reviewed pertinent reports

## 2023-04-26 NOTE — PROGRESS NOTES
[facility]  65   Progress Note  Name: Aleah Novak I  MRN: [id number]  Unit/Bed#: -01 I Date of Admission: 4/25/2023   Date of Service: 4/26/2023 I Hospital Day: 1    Assessment/Plan   Elevated troponin  Assessment & Plan  · Hs trop [id number]  · Likely non-ischemic myocardial injury in setting of sepsis  · Trended flat  ·  ED discussed with cards who  recommended echo - EF 55%, no regional wall abnormalities, no vegitations  · Telemetry    Cutaneous abscess of back excluding buttocks  Assessment & Plan  · Presenting with cutaneous abscess of back refractory to Bactrim  · CT thoracic spine revealing tiny cutaneous abscess with surrounding mild cellulitis   · Abx as below   · Analgesia p r n  Essential hypertension  Assessment & Plan  · Continue HCTZ, metoprolol  · BP is 117/74    * Sepsis ([address] )  Assessment & Plan  · Background: Presenting with cutaneous abscess of his back treated with I&D + Bactrim  Fevers initially improved, however now with persistent high fevers since 4/21  Reports hx MRSA skin infections  Also complaining of headaches, neck pain  · WBC 10 12  Procal 0 53  Lactic 0 8  Recorded Tmax 101 8  · CT thoracic spine: tiny cutaneous abscess in midline T10 level with mild adjacent subcutaneous cellulitis   · Source: likely cellulitis  Given symptoms, some concern for CNS infection  However lower given clinical picture  · On exam with neck pain w/ flexion  Negative Kernig/Brudzinksi  GCS 15 wo focal deficit   · Started on Rocephin + Vancomycin in ED  Continue with Rocephin, Vancomycin, ampicillin   · Neuro checks   · ID consult appreciated - patient still receiving abx   I would continue these until cultures negative for 48 hours, however will defer to ID who have seen the patient and currently working on plan  · Follow up on blood cultures  · Trend WBCS/fever curve/procal                 VTE Pharmacologic Prophylaxis: VTE Score: 3 Moderate Risk (Score 3-4) - "Pharmacological DVT Prophylaxis Ordered: heparin  Patient Centered Rounds: I performed bedside rounds with nursing staff today  Discussions with Specialists or Other Care Team Provider: Discussed with ID - they will advise regarding continued abx  Education and Discussions with Family / Patient: Called mother Ugo Morataya -   no answer  Unable to leave voicemail       Total Time Spent on Date of Encounter in care of patient: 30 min  This time was spent on one or more of the following: performing physical exam; counseling and coordination of care; obtaining or reviewing history; documenting in the medical record; reviewing/ordering tests, medications or procedures; communicating with other healthcare professionals and discussing with patient's family/caregivers  Current Length of Stay: 1 day(s)  Current Patient Status: Inpatient   Certification Statement: The patient will continue to require additional inpatient hospital stay due to monitor for fevers, follow up cultures  Discharge Plan: Anticipate discharge in 24-48 hrs to home  Code Status: Level 1 - Full Code    Subjective:   Patient seen and examined   Feeling \"okay\"    Objective:     Vitals:   Temp (24hrs), Av 3 °F (37 4 °C), Min:97 3 °F (36 3 °C), Max:101 9 °F (38 8 °C)    Temp:  [97 3 °F (36 3 °C)-101 9 °F (38 8 °C)] 99 2 °F (37 3 °C)  HR:  [] 94  Resp:  [16-20] 20  BP: (106-144)/(67-92) 117/74  SpO2:  [94 %-99 %] 95 %  Body mass index is 39 94 kg/m²  Input and Output Summary (last 24 hours): Intake/Output Summary (Last 24 hours) at 2023 1308  Last data filed at 2023 0100  Gross per 24 hour   Intake 473 ml   Output --   Net 473 ml       Physical Exam:   Physical Exam  Constitutional:       General: He is not in acute distress  Appearance: He is not ill-appearing or toxic-appearing  HENT:      Head: Normocephalic  Eyes:      Pupils: Pupils are equal, round, and reactive to light     Cardiovascular:      Rate and " Rhythm: Normal rate  Heart sounds: No murmur heard  No friction rub  No gallop  Pulmonary:      Effort: No respiratory distress  Breath sounds: No stridor  No wheezing, rhonchi or rales  Chest:      Chest wall: No tenderness  Abdominal:      General: There is no distension  Palpations: There is no mass  Tenderness: There is no abdominal tenderness  There is no right CVA tenderness, left CVA tenderness, guarding or rebound  Hernia: No hernia is present  Musculoskeletal:         General: No swelling, tenderness, deformity or signs of injury  Right lower leg: No edema  Skin:     Capillary Refill: Capillary refill takes less than 2 seconds  Coloration: Skin is pale  Skin is not jaundiced  Findings: No bruising, lesion or rash  Neurological:      General: No focal deficit present  Mental Status: He is alert  Cranial Nerves: No cranial nerve deficit  Sensory: No sensory deficit  Motor: No weakness        Coordination: Coordination normal       Gait: Gait normal       Deep Tendon Reflexes: Reflexes normal    Psychiatric:         Mood and Affect: Mood normal           Additional Data:     Labs:  Results from last 7 days   Lab Units 04/26/23  0506   WBC Thousand/uL 7 77   HEMOGLOBIN g/dL 12 8   HEMATOCRIT % 38 2   PLATELETS Thousands/uL 265   NEUTROS PCT % 61   LYMPHS PCT % 19   MONOS PCT % 18*   EOS PCT % 2     Results from last 7 days   Lab Units 04/26/23  0506 04/25/23  1405   SODIUM mmol/L 134* 130*   POTASSIUM mmol/L 3 5 3 5   CHLORIDE mmol/L 100 94*   CO2 mmol/L 25 27   BUN mg/dL 13 15   CREATININE mg/dL 0 74 0 99   ANION GAP mmol/L 9 9   CALCIUM mg/dL 8 4 9 7   ALBUMIN g/dL  --  4 0   TOTAL BILIRUBIN mg/dL  --  0 38   ALK PHOS U/L  --  49   ALT U/L  --  32   AST U/L  --  29   GLUCOSE RANDOM mg/dL 99 112     Results from last 7 days   Lab Units 04/25/23  1405   INR  1 25*             Results from last 7 days   Lab Units 04/25/23  1405   LACTIC ACID mmol/L 0 8   PROCALCITONIN ng/ml 0 53*       Lines/Drains:  Invasive Devices     Peripheral Intravenous Line  Duration           Peripheral IV 04/25/23 Left Antecubital <1 day                      Imaging: No pertinent imaging reviewed  Recent Cultures (last 7 days):   Results from last 7 days   Lab Units 04/25/23  1600 04/25/23  1406   BLOOD CULTURE  Received in Microbiology Lab  Culture in Progress  Received in Microbiology Lab  Culture in Progress  Last 24 Hours Medication List:   Current Facility-Administered Medications   Medication Dose Route Frequency Provider Last Rate   • acetaminophen  650 mg Oral Q6H PRN Tory Holder PA-C     • heparin (porcine)  5,000 Units Subcutaneous [facility] Tory Holder PA-C     • hydrochlorothiazide  25 mg Oral Daily Tory Holder PA-C     • methocarbamol  500 mg Oral Q6H PRN Melissa Ferris PA-C     • metoprolol succinate  50 mg Oral Daily Tory Holder PA-C          Today, Patient Was Seen By: Valerie Johnson MD    **Please Note: This note may have been constructed using a voice recognition system  **

## 2023-04-26 NOTE — ASSESSMENT & PLAN NOTE
· Hs trop 106>821>723  · Likely non-ischemic myocardial injury in setting of sepsis  · Trended flat  ·  ED discussed with cards who  recommended echo - EF 55%, no regional wall abnormalities, no vegitations  · Telemetry

## 2023-04-26 NOTE — ASSESSMENT & PLAN NOTE
· Background: Presenting with cutaneous abscess of his back treated with I&D + Bactrim  Fevers initially improved, however now with persistent high fevers since 4/21  Reports hx MRSA skin infections  Also complaining of headaches, neck pain  · WBC 10 12  Procal 0 53  Lactic 0 8  Recorded Tmax 101 8  · CT thoracic spine: tiny cutaneous abscess in midline T10 level with mild adjacent subcutaneous cellulitis   · Source: likely cellulitis  Given symptoms, some concern for CNS infection  However lower given clinical picture  · On exam with neck pain w/ flexion  Negative Kernig/Brudzinksi  GCS 15 wo focal deficit   · Started on Rocephin + Vancomycin in ED   Continue with Rocephin, Vancomycin, ampicillin   · Neuro checks   · ID consult appreciated   · Follow up on blood cultures  · Trend WBCS/fever curve/procal

## 2023-04-26 NOTE — ASSESSMENT & PLAN NOTE
· Hs trop 183>214>967  · Likely non-ischemic myocardial injury in setting of sepsis  · Trended flat  · Cardiology consulted in ED recommending echo - ordered  · Telemetry

## 2023-04-26 NOTE — UTILIZATION REVIEW
Initial Clinical Review    Admission: Date/Time/Statement:   Admission Orders (From admission, onward)     Ordered        04/25/23 1717  INPATIENT ADMISSION  Once                      Orders Placed This Encounter   Procedures   • INPATIENT ADMISSION     Standing Status:   Standing     Number of Occurrences:   1     Order Specific Question:   Level of Care     Answer:   Med Surg [16]     Order Specific Question:   Estimated length of stay     Answer:   More than 2 Midnights     Order Specific Question:   Certification     Answer:   I certify that inpatient services are medically necessary for this patient for a duration of greater than two midnights  See H&P and MD Progress Notes for additional information about the patient's course of treatment  ED Arrival Information     Expected   -    Arrival   4/25/2023 13:22    Acuity   Urgent            Means of arrival   Walk-In    Escorted by   Self    Service   Hospitalist    Admission type   Emergency            Arrival complaint   fever            Chief Complaint   Patient presents with   • Fever - 9 weeks to 74 years     Pt noticed an abscess on his back 4/8 and was prescribed Bactrim  [facility] and on 4/21 they drained it  Pt has had a fever since 4/21  PTA denies taking tylenol or motrin  Initial Presentation: 47 y o  male with PMHx of HTN, MRSA presents to ED as a walk-in with persistent fevers ongoing for ~ 2 wks  Pt seen at urgent care and tx'd for a subcutaneous abscess on his back by I&D and started on bactrim  Fevers initially improved, however now with persistent high fevers since 4/21  Reports hx MRSA skin infections  Also c/o headaches, neck pain  In ED WBC 10 12  Procal 0 53  Lactic 0 8  Recorded Tmax 101 8  CT thoracic spine: tiny cutaneous abscess in midline T10 level with mild adjacent subcutaneous cellulitis  Tachycardic  Started on Rocephin and Vanco in ED    ADMIT INPATIENT to M/S UNIT with SEPSIS, CUTANEOUS ABSCESS OF BACK -- continue with Rocephin, Vancomycin, ampicillin  Neuro checks  ID consulted  Blood cxs pending  Trend WBCs/gever curve  Analgesics prn  Telemetry  Continue pta HCTZ, metoprolol  Date: 4/26   Day 2: Marisabel Huang 146>417>397, likely non-ischemic myocardial injury in setting of sepsis  Echo with EF 55%, no regional wall abnormalities, no vegetations  Continue IV abx per ID  Supportive care  ID consult -- SIRS vs Sepsis, MRSA Thoracic back abscess  S/p lidocaine injection followed by spontaneous drainage 4/21/23 at [facility] with wound culture MRSA sensitive to tetracycline, H O MRSA bois recurrence since 2022  continue IV Vancomycin pending blood cultures  Vancomycin dosed per pharmacy  Anticipate transition to Doxycycline to complete 1 wk course from spontaneous drainage opening  Serial exams  F/u blood cxs   Recheck CBC, CMP in AM      ED Triage Vitals   Temperature Pulse Respirations Blood Pressure SpO2   04/25/23 1331 04/25/23 1331 04/25/23 1331 04/25/23 1331 04/25/23 1331   (!) 101 9 °F (38 8 °C) (!) 115 16 144/86 96 %      Temp Source Heart Rate Source Patient Position - Orthostatic VS BP Location FiO2 (%)   04/25/23 1331 04/25/23 1427 04/25/23 1331 04/25/23 1331 --   Oral Monitor Lying Right arm       Pain Score       04/25/23 1331       No Pain          Wt Readings from Last 1 Encounters:   04/26/23 116 kg (255 lb)     Additional Vital Signs:  Date/Time Temp Pulse Resp BP SpO2 O2 Device Patient Position - Orthostatic VS   04/26/23 0700 99 2 °F (37 3 °C) 94 20 117/74 95 % None (Room air) --   04/26/23 0300 97 7 °F (36 5 °C) -- -- -- -- -- --   04/25/23 2329 100 6 °F (38 1 °C) Abnormal  99 20 121/92 95 % -- Lying   04/25/23 1938 -- -- -- -- -- None (Room air) --   04/25/23 1825 97 3 °F (36 3 °C) Abnormal  85 16 121/75 95 % -- Lying   04/25/23 1718 98 4 °F (36 9 °C) 89 18 130/82 99 % None (Room air) Sitting   04/25/23 1629 -- 95 18 130/77 97 % None (Room air) Sitting   04/25/23 1607 -- 92 18 130/77 96 % None (Room air) Sitting   04/25/23 1513 99 7 °F (37 6 °C) 99 18 115/75 94 % None (Room air) Lying   04/25/23 1427 -- 105 18 106/67 96 % None (Room air) Sitting   04/25/23 1331 101 9 °F (38 8 °C) Abnormal  115 Abnormal  16 144/86 96 % None (Room air) Lying     Pertinent Labs/Diagnostic Test Results:   CT spine thoracic w contrast   Final Result by Sadie Woodard MD (04/25 0848)      Superior aspect of T1 vertebral body was not not fully included in the field-of-view  - No acute osseous abnormality of thoracic spine    - Tiny cutaneous abscess in midline T10 level with mild adjacent subcutaneous cellulitis  Recommend direct visualization for further evaluation    - Chronic L2 superior endplate compression fracture with mild height loss  - Additional chronic/incidental findings as detailed above  XR chest portable   Final Result by Rowena Sol MD (04/26 4817)      No acute cardiopulmonary disease          Results from last 7 days   Lab Units 04/25/23  1422   SARS-COV-2  Negative     Results from last 7 days   Lab Units 04/26/23  0506 04/25/23  1405   WBC Thousand/uL 7 77 10 12   HEMOGLOBIN g/dL 12 8 14 4   HEMATOCRIT % 38 2 42 1   PLATELETS Thousands/uL 265 311   NEUTROS ABS Thousands/µL 4 72 7 15     Results from last 7 days   Lab Units 04/26/23  0506 04/25/23  1405   SODIUM mmol/L 134* 130*   POTASSIUM mmol/L 3 5 3 5   CHLORIDE mmol/L 100 94*   CO2 mmol/L 25 27   ANION GAP mmol/L 9 9   BUN mg/dL 13 15   CREATININE mg/dL 0 74 0 99   EGFR ml/min/1 73sq m 105 86   CALCIUM mg/dL 8 4 9 7     Results from last 7 days   Lab Units 04/25/23  1405   AST U/L 29   ALT U/L 32   ALK PHOS U/L 49   TOTAL PROTEIN g/dL 7 7   ALBUMIN g/dL 4 0   TOTAL BILIRUBIN mg/dL 0 38     Results from last 7 days   Lab Units 04/26/23  0506 04/25/23  1405   GLUCOSE RANDOM mg/dL 99 112     Results from last 7 days   Lab Units 04/25/23  1936 04/25/23  1424   HS TNI 0HR ng/L --  465*   HS TNI 4HR ng/L 305*  --    HSTNI D4 ng/L -160  --      Results from last 7 days   Lab Units 04/25/23  1405   PROTIME seconds 16 0*   INR  1 25*   PTT seconds 31     Results from last 7 days   Lab Units 04/25/23  1405   PROCALCITONIN ng/ml 0 53*     Results from last 7 days   Lab Units 04/25/23  1405   LACTIC ACID mmol/L 0 8     Results from last 7 days   Lab Units 04/25/23  1535   CLARITY UA  Clear   COLOR UA  Yellow   SPEC GRAV UA  1 025   PH UA  5 5   GLUCOSE UA mg/dl Negative   KETONES UA mg/dl Negative   BLOOD UA  2+*   PROTEIN UA mg/dl Negative   NITRITE UA  Negative   BILIRUBIN UA  Negative   UROBILINOGEN UA E U /dl 0 2   LEUKOCYTES UA  Negative   WBC UA /hpf None Seen   RBC UA /hpf 0-1   BACTERIA UA /hpf Occasional   EPITHELIAL CELLS WET PREP /hpf Occasional     Results from last 7 days   Lab Units 04/25/23  1422   INFLUENZA A PCR  Negative   INFLUENZA B PCR  Negative   RSV PCR  Negative     Results from last 7 days   Lab Units 04/25/23  1600 04/25/23  1406   BLOOD CULTURE  Received in Microbiology Lab  Culture in Progress  Received in Microbiology Lab  Culture in Progress       ED Treatment:   Medication Administration from 04/25/2023 1321 to 04/25/2023 1816       Date/Time Order Dose Route Action     04/25/2023 1404 EDT sodium chloride 0 9 % bolus 1,000 mL 1,000 mL Intravenous New Bag     04/25/2023 1414 EDT acetaminophen (TYLENOL) tablet 975 mg 975 mg Oral Given     04/25/2023 1513 EDT sodium chloride 0 9 % bolus 1,000 mL 1,000 mL Intravenous New Bag     04/25/2023 1600 EDT cefTRIAXone (ROCEPHIN) IVPB (premix in dextrose) 1,000 mg 50 mL 1,000 mg Intravenous New Bag     04/25/2023 1708 EDT vancomycin (VANCOCIN) 2,000 mg in sodium chloride 0 9 % 500 mL IVPB 2,000 mg Intravenous New Bag     04/25/2023 1742 EDT ketorolac (TORADOL) injection 15 mg 15 mg Intravenous Given     Past Medical History:   Diagnosis Date   • Hypertension        Admitting Diagnosis: Hyponatremia [E87 1]  Cellulitis of other specified site [R27 277]  Sepsis with acute organ dysfunction without septic shock, due to unspecified organism, unspecified type ([facility] 75 ) [A41 9, R65 20]  Age/Sex: 47 y o  male  Admission Orders:  Scheduled Medications:  vancomycin, 750 mg, Intravenous, Q8H   And  cefTRIAXone, 2,000 mg, Intravenous, Q12H   And  ampicillin, 2,000 mg, Intravenous, Q4H  heparin (porcine), 5,000 Units, Subcutaneous, Q8H SUZY  hydrochlorothiazide, 25 mg, Oral, Daily  metoprolol succinate, 50 mg, Oral, Daily    PRN Meds:  acetaminophen, 650 mg, Oral, Q6H PRN 4/25 x1  methocarbamol, 500 mg, Oral, Q6H PRN          Network Utilization Review Department  ATTENTION: Please call with any questions or concerns to [phone] and carefully listen to the prompts so that you are directed to the right person  All voicemails are confidential   [facility] all requests for admission clinical reviews, approved or denied determinations and any other requests to dedicated fax number below belonging to the campus where the patient is receiving treatment   List of dedicated fax numbers for the Facilities:  [address] DENIALS (Administrative/Medical Necessity) [phone]   [address] (Maternity/NICU/Pediatrics) [phone]   1 Ashly Burch [phone]   Haris Estrada  [phone]   [address] [place] [address] Richi [facility] [phone]   [facility]6 [facility] 134 815 [facility] [phone]

## 2023-04-26 NOTE — ASSESSMENT & PLAN NOTE
· Presenting with cutaneous abscess of back refractory to Bactrim  · CT thoracic spine revealing tiny cutaneous abscess with surrounding mild cellulitis   · Abx as below   · Analgesia p r n

## 2023-04-26 NOTE — PROGRESS NOTES
Viktoria Shultz  is a 47 y o  male who is currently ordered Vancomycin IV with management by the Pharmacy Consult service  Relevant clinical data and objective / subjective history reviewed  Vancomycin Assessment:  Indication and Goal AUC/Trough: CNS infection (goal -600, trough 15-20); Soft tissue (goal -600, trough >10)  Clinical Status: stable  Micro:   Blood cultures pending  Renal Function:  SCr: 0 74 mg/dL  CrCl: 121 6 mL/min  Renal replacement: Not on dialysis  Days of Therapy: 2  Current Dose: 1750 mg IV q12h  Vancomycin Plan:  New Dosin mg IV q8h  Estimated AUC: 577 mcg*hr/mL  Estimated Trough: 17 6 mcg/mL  Next Level: 23 with AM labs  Renal Function Monitoring: Daily BMP and [place] will continue to follow closely for s/sx of nephrotoxicity, infusion reactions and appropriateness of therapy  BMP and CBC will be ordered per protocol  We will continue to follow the patient’s culture results and clinical progress daily      Sam Mcdaniel, Pharmacist

## 2023-04-26 NOTE — H&P
[address]  H&P  Name: Dennis Estrada  47 y o  male I MRN: [id number]  Unit/Bed#: -01 I Date of Admission: 4/25/2023   Date of Service: 4/26/2023 I Hospital Day: 1      Assessment/Plan   * Sepsis [facility])  Assessment & Plan  · Background: Presenting with cutaneous abscess of his back treated with I&D + Bactrim  Fevers initially improved, however now with persistent high fevers since 4/21  Reports hx MRSA skin infections  Also complaining of headaches, neck pain  · WBC 10 12  Procal 0 53  Lactic 0 8  Recorded Tmax 101 8  · CT thoracic spine: tiny cutaneous abscess in midline T10 level with mild adjacent subcutaneous cellulitis   · Source: likely cellulitis  Given symptoms, some concern for CNS infection  However lower given clinical picture  · On exam with neck pain w/ flexion  Negative Kernig/Brudzinksi  GCS 15 wo focal deficit   · Started on Rocephin + Vancomycin in ED  Continue with Rocephin, Vancomycin, ampicillin   · Neuro checks   · ID consult appreciated   · Follow up on blood cultures  · Trend WBCS/fever curve/procal      Cutaneous abscess of back excluding buttocks  Assessment & Plan  · Presenting with cutaneous abscess of back refractory to Bactrim  · CT thoracic spine revealing tiny cutaneous abscess with surrounding mild cellulitis   · Abx as below   · Analgesia p r n  Elevated troponin  Assessment & Plan  · Hs trop 273>908>553  · Likely non-ischemic myocardial injury in setting of sepsis  · Trended flat  · Cardiology consulted in ED recommending echo - ordered  · Telemetry    Essential hypertension  Assessment & Plan  · Continue HCTZ, metoprolol       VTE Pharmacologic Prophylaxis: VTE Score: 3 heparin  Code Status: Level 1 - Full Code   Discussion with family: Patient declined call to        Anticipated Length of Stay: Patient will be admitted on an inpatient basis with an anticipated length of stay of greater than 2 midnights secondary to sepsis requiring IV abx, evaluation by ID  Total Time Spent on Date of Encounter in care of patient: 40 minutes This time was spent on one or more of the following: performing physical exam; counseling and coordination of care; obtaining or reviewing history; documenting in the medical record; reviewing/ordering tests, medications or procedures; communicating with other healthcare professionals and discussing with patient's family/caregivers  Chief Complaint: fevers    History of Present Illness:  Pako Rosales  is a 47 y o  male with a PMH of HTN, MRSA who presents with persistent fevers  Patient reports this has been an ongoing problem for approximate the past 2 weeks  He was seen at urgent care initially and treated for a subcutaneous abscess on his back  Underwent I&D and was started on Bactrim at that time given history of MRSA  Stated was reevaluated with improvement in the abscess  However he continued to have fevers  He notes associated generalized weakness  Also endorses a headache and neck pain  He denies photophobia or phonophobia  Reports that he chronically has some neck discomfort, however states this pain feels different  Denies pain, shortness of breath, abdominal pain, nausea/vomiting/diarrhea, urinary complaints  No new rashes on the skin  Also states that he did have a fall the other day during his back that started after fevers  He denies IV drug use, or alcohol use  Occasional marijuanna use  Believes he has his meningitis vaccinations  In the ED, patient met sepsis criteria with fever and tachycardia  Treated with IV vancomycin and Rocephin and given fluid bolus with improvement  Underwent CT of the thoracic spine revealing tiny cutaneous abscess in the midline T10 level with mild adjacent subcutaneous cellulitis  Patient to be admitted for IV antibiotics and further evaluation by infectious disease      Review of Systems:  Review of Systems   Constitutional: Positive for "chills and fever  HENT: Negative for congestion  Eyes: Negative for photophobia  Respiratory: Negative for shortness of breath  Cardiovascular: Negative for chest pain  Gastrointestinal: Negative for abdominal pain, diarrhea, nausea and vomiting  Genitourinary: Negative for difficulty urinating  Musculoskeletal: Positive for back pain, myalgias, neck pain and neck stiffness  Skin: Negative for rash  Neurological: Negative for dizziness  Past Medical and Surgical History:   Past Medical History:   Diagnosis Date   • Hypertension        History reviewed  No pertinent surgical history  Meds/Allergies:  Prior to Admission medications    Medication Sig Start Date End Date Taking? Authorizing Provider   hydrochlorothiazide (HYDRODIURIL) 25 mg tablet Take 25 mg by mouth daily   Yes Historical Provider, MD   metoprolol succinate (TOPROL-XL) 50 mg 24 hr tablet Take 50 mg by mouth daily   Yes Historical Provider, MD     I have reviewed home medications with patient personally  Allergies: No Known Allergies    Social History:  Marital Status: Single   Occupation:   Patient Pre-hospital Living Situation: Home  Patient Pre-hospital Level of Mobility: walks  Patient Pre-hospital Diet Restrictions:   Substance Use History:   Social History     Substance and Sexual Activity   Alcohol Use Not Currently     Social History     Tobacco Use   Smoking Status Never   Smokeless Tobacco Never     Social History     Substance and Sexual Activity   Drug Use Yes   • Types: Marijuana       Family History:  History reviewed  No pertinent family history      Physical Exam:     Vitals:   Blood Pressure: 121/92 (04/25/23 2329)  Pulse: 99 (04/25/23 2329)  Temperature: (!) 100 6 °F (38 1 °C) (04/25/23 2329)  Temp Source: Oral (04/25/23 2329)  Respirations: 20 (04/25/23 2329)  Height: 5' 7\" (170 2 cm) (04/25/23 1825)  Weight - Scale: 116 kg (255 lb 0 8 oz) (04/25/23 1825)  SpO2: 95 % (04/25/23 2329)    Physical " Exam  HENT:      Head: Normocephalic and atraumatic  Mouth/Throat:      Mouth: Mucous membranes are moist    Eyes:      Extraocular Movements: Extraocular movements intact  Cardiovascular:      Rate and Rhythm: Normal rate and regular rhythm  Pulses: Normal pulses  Heart sounds: Normal heart sounds  Pulmonary:      Effort: Pulmonary effort is normal  No respiratory distress  Abdominal:      General: Abdomen is flat  Bowel sounds are normal       Palpations: Abdomen is soft  Tenderness: There is no abdominal tenderness  Musculoskeletal:      Right lower leg: No edema  Left lower leg: No edema  Skin:     General: Skin is warm and dry  Neurological:      General: No focal deficit present  Mental Status: He is alert and oriented to person, place, and time  GCS: GCS eye subscore is 4  GCS verbal subscore is 5  GCS motor subscore is 6  Cranial Nerves: Cranial nerves 2-12 are intact  No dysarthria or facial asymmetry  Motor: No weakness  Psychiatric:         Mood and Affect: Mood is anxious            Additional Data:   Lab Results:  Results from last 7 days   Lab Units 04/25/23  1405   WBC Thousand/uL 10 12   HEMOGLOBIN g/dL 14 4   HEMATOCRIT % 42 1   PLATELETS Thousands/uL 311   NEUTROS PCT % 69   LYMPHS PCT % 13*   MONOS PCT % 15*   EOS PCT % 1     Results from last 7 days   Lab Units 04/25/23  1405   SODIUM mmol/L 130*   POTASSIUM mmol/L 3 5   CHLORIDE mmol/L 94*   CO2 mmol/L 27   BUN mg/dL 15   CREATININE mg/dL 0 99   ANION GAP mmol/L 9   CALCIUM mg/dL 9 7   ALBUMIN g/dL 4 0   TOTAL BILIRUBIN mg/dL 0 38   ALK PHOS U/L 49   ALT U/L 32   AST U/L 29   GLUCOSE RANDOM mg/dL 112     Results from last 7 days   Lab Units 04/25/23  1405   INR  1 25*             Results from last 7 days   Lab Units 04/25/23  1405   LACTIC ACID mmol/L 0 8   PROCALCITONIN ng/ml 0 53*       Lines/Drains:  Invasive Devices     Peripheral Intravenous Line  Duration           Peripheral IV 04/25/23 Left Antecubital <1 day                    Imaging: Reviewed radiology reports from this admission including: CT thoracic spine  CT spine thoracic w contrast   Final Result by Elkin Sawyer MD (04/25 5339)      Superior aspect of T1 vertebral body was not not fully included in the field-of-view  - No acute osseous abnormality of thoracic spine    - Tiny cutaneous abscess in midline T10 level with mild adjacent subcutaneous cellulitis  Recommend direct visualization for further evaluation    - Chronic L2 superior endplate compression fracture with mild height loss  - Additional chronic/incidental findings as detailed above  The study was marked in [facility] for immediate notification  Workstation performed: [id number]         XR chest portable    (Results Pending)       EKG and Other Studies Reviewed on Admission:   · EKG: NSR  HR 90  early repolarization  QTc 409    ** Please Note: This note has been constructed using a voice recognition system   **

## 2023-04-26 NOTE — ASSESSMENT & PLAN NOTE
· Background: Presenting with cutaneous abscess of his back treated with I&D + Bactrim  Fevers initially improved, however now with persistent high fevers since 4/21  Reports hx MRSA skin infections  Also complaining of headaches, neck pain  · WBC 10 12  Procal 0 53  Lactic 0 8  Recorded Tmax 101 8  · CT thoracic spine: tiny cutaneous abscess in midline T10 level with mild adjacent subcutaneous cellulitis   · Source: likely cellulitis  Given symptoms, some concern for CNS infection  However lower given clinical picture  · On exam with neck pain w/ flexion  Negative Kernig/Brudzinksi  GCS 15 wo focal deficit   · Started on Rocephin + Vancomycin in ED  Continue with Rocephin, Vancomycin, ampicillin   · Neuro checks   · ID consult appreciated - patient still receiving abx   I would continue these until cultures negative for 48 hours, however will defer to ID who have seen the patient and currently working on plan  · Follow up on blood cultures  · Trend WBCS/fever curve/procal

## 2023-04-26 NOTE — CASE MANAGEMENT
Case Management Assessment & Discharge Planning Note    Patient name Kiet Kevin /-01 MRN [id number]  : 1970 Date 2023       Current Admission Date: 2023  Current Admission Diagnosis:Sepsis [facility])   Patient Active Problem List    Diagnosis Date Noted   • Essential hypertension 2023   • Sepsis ([address] ) 2023   • Cutaneous abscess of back excluding buttocks 2023   • Elevated troponin 2023      LOS (days): 1  Geometric Mean LOS (GMLOS) (days):   Days to GMLOS:     OBJECTIVE:    Risk of Unplanned Readmission Score: 9 48         Current admission status: Inpatient       Preferred Pharmacy:   [facility] [address]  [place] [id number]  Phone: [phone] Fax: [phone]    Primary Care Provider: No primary care provider on file  Primary Insurance: [facility]  Secondary Insurance:     ASSESSMENT:  Active Health Care Proxies    There are no active Health Care Proxies on file  Readmission Root Cause  30 Day Readmission: No    Patient Information  Admitted from[de-identified] Home  Mental Status: Alert  During Assessment patient was accompanied by: Not accompanied during assessment  Assessment information provided by[de-identified] Patient  Primary Caregiver: Self  Support Systems: Self, Family members, Parent  Home entry access options   Select all that apply : Stairs  Number of steps to enter home : 7  Do the steps have railings?: Yes  Type of Current Residence: [address] [place] home (Patient resides in [place], but staying with elderly mother for a month)  Upon entering residence, is there a bedroom on the main floor (no further steps)?: No  A bedroom is located on the following floor levels of residence (select all that apply):: 2nd Floor  Upon entering residence, is there a bathroom on the main floor (no further steps)?: No  Indicate which floors of current residence have a bathroom (select all the apply):: 2nd Floor  Number of steps to 2nd floor from main floor: One Flight  In the last 12 months, was there a time when you were not able to pay the mortgage or rent on time?: No  In the last 12 months, was there a time when you did not have a steady place to sleep or slept in a shelter (including now)?: No  Homeless/housing insecurity resource given?: N/A  Living Arrangements: Lives Alone (Visiting mother here in [place])    Activities of Daily Living Prior to Admission  Functional Status: Independent  Completes ADLs independently?: Yes  Ambulates independently?: Yes  Does patient use assisted devices?: No  Does patient currently own DME?: No  Does patient have a history of Outpatient Therapy (PT/OT)?: No  Does the patient have a history of Short-Term Rehab?: No  Does patient have a history of HHC?: No  Does patient currently have [facility]?: No         Patient Information Continued  Income Source: Government aid  Does patient have prescription coverage?: Yes ([employer])  Within the past 12 months, you worried that your food would run out before you got the money to buy more : Never true  Within the past 12 months, the food you bought just didn't last and you didn't have money to get more : Never true  Food insecurity resource given?: N/A  Does patient receive dialysis treatments?: No  Does patient have a history of substance abuse?: No  Does patient have a history of Mental Health Diagnosis?: No         Means of Transportation  Means of Transport to Appts[de-identified] Drives Self  In the past 12 months, has lack of transportation kept you from medical appointments or from getting medications?: No  In the past 12 months, has lack of transportation kept you from meetings, work, or from getting things needed for daily living?: No  Was application for public transport provided?: N/A        DISCHARGE DETAILS:    Discharge planning discussed with[de-identified] patient  Freedom of Choice: Yes  Comments - Freedom of Choice: Cm met with patient to review role of Cm  Patient states that he is visit his mother here in the [place] area from [place] [address]  Patient plans to return to his home after visit  Anticipated plan to dc home tomorrow on oral antibiotics    CM contacted family/caregiver?: No- see comments       Treatment Team Recommendation: Home  Discharge Destination Plan[de-identified] Home  Transport at Discharge : Family

## 2023-04-26 NOTE — PROGRESS NOTES
Jorge Luis Stovall  is a 47 y o  male who is currently ordered Vancomycin IV with management by the Pharmacy Consult service  Relevant clinical data and objective / subjective history reviewed  Vancomycin Assessment:  Indication and Goal AUC/Trough: CNS infection (goal -600, trough 15-20)  Clinical Status:  New start  Micro:   pending  Renal Function:  SCr: 0 99 mg/dL  CrCl: 105 1 mL/min  Renal replacement: Not on dialysis  Days of Therapy: 1  Current Dose: 2000mg IV once then 2250mg IV q12h  Vancomycin Plan:  New Dosinmg IV once then 750mg IV q8h  Estimated AUC: 572 mcg*hr/mL  Estimated Trough: 18 8 mcg/mL  Next Level: 23 0430 Trough  Renal Function Monitoring: Daily BMP and UOP  Pharmacy will continue to follow closely for s/sx of nephrotoxicity, infusion reactions and appropriateness of therapy  BMP and CBC will be ordered per protocol  We will continue to follow the patient’s culture results and clinical progress daily      Brian Mcneal, Pharmacist

## 2023-04-27 VITALS
SYSTOLIC BLOOD PRESSURE: 111 MMHG | OXYGEN SATURATION: 95 % | HEIGHT: 67 IN | HEART RATE: 66 BPM | DIASTOLIC BLOOD PRESSURE: 71 MMHG | RESPIRATION RATE: 20 BRPM | TEMPERATURE: 97.6 F | BODY MASS INDEX: 40.02 KG/M2 | WEIGHT: 255 LBS

## 2023-04-27 LAB
ALBUMIN SERPL BCP-MCNC: 3.6 G/DL (ref 3.5–5)
ALP SERPL-CCNC: 43 U/L (ref 34–104)
ALT SERPL W P-5'-P-CCNC: 35 U/L (ref 7–52)
ANION GAP SERPL CALCULATED.3IONS-SCNC: 11 MMOL/L (ref 4–13)
AST SERPL W P-5'-P-CCNC: 26 U/L (ref 13–39)
BASOPHILS # BLD AUTO: 0.04 THOUSANDS/ΜL (ref 0–0.1)
BASOPHILS NFR BLD AUTO: 1 % (ref 0–1)
BILIRUB SERPL-MCNC: 0.32 MG/DL (ref 0.2–1)
BUN SERPL-MCNC: 12 MG/DL (ref 5–25)
CALCIUM SERPL-MCNC: 9 MG/DL (ref 8.4–10.2)
CHLORIDE SERPL-SCNC: 97 MMOL/L (ref 96–108)
CO2 SERPL-SCNC: 27 MMOL/L (ref 21–32)
CREAT SERPL-MCNC: 0.73 MG/DL (ref 0.6–1.3)
EOSINOPHIL # BLD AUTO: 0.22 THOUSAND/ΜL (ref 0–0.61)
EOSINOPHIL NFR BLD AUTO: 3 % (ref 0–6)
ERYTHROCYTE [DISTWIDTH] IN BLOOD BY AUTOMATED COUNT: 12.8 % (ref 11.6–15.1)
GFR SERPL CREATININE-BSD FRML MDRD: 105 ML/MIN/1.73SQ M
GLUCOSE SERPL-MCNC: 101 MG/DL (ref 65–140)
HCT VFR BLD AUTO: 38 % (ref 36.5–49.3)
HGB BLD-MCNC: 12.8 G/DL (ref 12–17)
IMM GRANULOCYTES # BLD AUTO: 0.05 THOUSAND/UL (ref 0–0.2)
IMM GRANULOCYTES NFR BLD AUTO: 1 % (ref 0–2)
LYMPHOCYTES # BLD AUTO: 1.99 THOUSANDS/ΜL (ref 0.6–4.47)
LYMPHOCYTES NFR BLD AUTO: 23 % (ref 14–44)
MCH RBC QN AUTO: 29.7 PG (ref 26.8–34.3)
MCHC RBC AUTO-ENTMCNC: 33.7 G/DL (ref 31.4–37.4)
MCV RBC AUTO: 88 FL (ref 82–98)
MONOCYTES # BLD AUTO: 1.31 THOUSAND/ΜL (ref 0.17–1.22)
MONOCYTES NFR BLD AUTO: 15 % (ref 4–12)
NEUTROPHILS # BLD AUTO: 5.06 THOUSANDS/ΜL (ref 1.85–7.62)
NEUTS SEG NFR BLD AUTO: 57 % (ref 43–75)
NRBC BLD AUTO-RTO: 0 /100 WBCS
PLATELET # BLD AUTO: 322 THOUSANDS/UL (ref 149–390)
PMV BLD AUTO: 9.2 FL (ref 8.9–12.7)
POTASSIUM SERPL-SCNC: 3.4 MMOL/L (ref 3.5–5.3)
PROCALCITONIN SERPL-MCNC: 0.23 NG/ML
PROT SERPL-MCNC: 7 G/DL (ref 6.4–8.4)
RBC # BLD AUTO: 4.31 MILLION/UL (ref 3.88–5.62)
SODIUM SERPL-SCNC: 135 MMOL/L (ref 135–147)
VANCOMYCIN TROUGH SERPL-MCNC: 9.1 UG/ML (ref 10–20)
WBC # BLD AUTO: 8.67 THOUSAND/UL (ref 4.31–10.16)

## 2023-04-27 RX ORDER — DOXYCYCLINE HYCLATE 100 MG/1
100 CAPSULE ORAL EVERY 12 HOURS SCHEDULED
Qty: 10 CAPSULE | Refills: 0 | Status: SHIPPED | OUTPATIENT
Start: 2023-04-27 | End: 2023-05-02

## 2023-04-27 RX ORDER — POTASSIUM CHLORIDE 20 MEQ/1
40 TABLET, EXTENDED RELEASE ORAL ONCE
Status: COMPLETED | OUTPATIENT
Start: 2023-04-27 | End: 2023-04-27

## 2023-04-27 RX ORDER — DOXYCYCLINE HYCLATE 100 MG/1
100 CAPSULE ORAL EVERY 12 HOURS SCHEDULED
Status: DISCONTINUED | OUTPATIENT
Start: 2023-04-27 | End: 2023-04-27 | Stop reason: HOSPADM

## 2023-04-27 RX ORDER — CHLORHEXIDINE GLUCONATE 4 G/100ML
1 SOLUTION TOPICAL EVERY OTHER DAY
Qty: 120 ML | Refills: 0 | Status: SHIPPED | OUTPATIENT
Start: 2023-04-27

## 2023-04-27 RX ADMIN — POTASSIUM CHLORIDE 40 MEQ: 1500 TABLET, EXTENDED RELEASE ORAL at 08:39

## 2023-04-27 RX ADMIN — METOPROLOL SUCCINATE 50 MG: 50 TABLET, EXTENDED RELEASE ORAL at 08:39

## 2023-04-27 RX ADMIN — HYDROCHLOROTHIAZIDE 25 MG: 25 TABLET ORAL at 08:39

## 2023-04-27 RX ADMIN — DOXYCYCLINE 100 MG: 100 CAPSULE ORAL at 13:40

## 2023-04-27 NOTE — UTILIZATION REVIEW
Continued Stay Review    Date: 4/27/23                          Current Patient Class: IP  Current Level of Care: Med Surg    HPI:52 y o  male initially admitted on 4/25     Assessment/Plan:     Vital Signs:     Date/Time Temp Pulse Resp BP MAP (mmHg) SpO2 O2 Device   04/27/23 0742 97 6 °F (36 4 °C) 66 20 111/71 -- 95 % --   04/27/23 0046 98 3 °F (36 8 °C) 93 16 109/83 93 94 % None (Room air)   04/26/23 2000 -- -- -- -- -- -- None (Room air)   04/26/23 1414 98 3 °F (36 8 °C) 92 20 114/62 -- 95 % --       Pertinent Labs/Diagnostic Results:     4/26 Echo: Interpretation Summary       •  Left Ventricle: Left ventricular cavity size is normal  Wall thickness is mildly increased  There is mild concentric hypertrophy  The left ventricular ejection fraction is 55%  Systolic function is normal  No diagnostic regional wall motion abnormality was identified  Limited sensitivity   Diastolic function is normal       Results from last 7 days   Lab Units 04/25/23  1422   SARS-COV-2  Negative     Results from last 7 days   Lab Units 04/27/23  0458 04/26/23  0506 04/25/23  1405   WBC Thousand/uL 8 67 7 77 10 12   HEMOGLOBIN g/dL 12 8 12 8 14 4   HEMATOCRIT % 38 0 38 2 42 1   PLATELETS Thousands/uL 322 265 311   NEUTROS ABS Thousands/µL 5 06 4 72 7 15         Results from last 7 days   Lab Units 04/27/23  0500 04/26/23  0506 04/25/23  1405   SODIUM mmol/L 135 134* 130*   POTASSIUM mmol/L 3 4* 3 5 3 5   CHLORIDE mmol/L 97 100 94*   CO2 mmol/L 27 25 27   ANION GAP mmol/L 11 9 9   BUN mg/dL 12 13 15   CREATININE mg/dL 0 73 0 74 0 99   EGFR ml/min/1 73sq m 105 105 86   CALCIUM mg/dL 9 0 8 4 9 7     Results from last 7 days   Lab Units 04/27/23  0500 04/25/23  1405   AST U/L 26 29   ALT U/L 35 32   ALK PHOS U/L 43 49   TOTAL PROTEIN g/dL 7 0 7 7   ALBUMIN g/dL 3 6 4 0   TOTAL BILIRUBIN mg/dL 0 32 0 38         Results from last 7 days   Lab Units 04/27/23  0500 04/26/23  0506 04/25/23  1405   GLUCOSE RANDOM mg/dL 101 99 112 Results from last 7 days   Lab Units 04/25/23  1936 04/25/23  1424   HS TNI 0HR ng/L  --  465*   HS TNI 4HR ng/L 305*  --    HSTNI D4 ng/L -160  --          Results from last 7 days   Lab Units 04/25/23  1405   PROTIME seconds 16 0*   INR  1 25*   PTT seconds 31         Results from last 7 days   Lab Units 04/27/23  0500 04/25/23  1405   PROCALCITONIN ng/ml 0 23 0 53*     Results from last 7 days   Lab Units 04/25/23  1405   LACTIC ACID mmol/L 0 8           Results from last 7 days   Lab Units 04/25/23  1535   CLARITY UA  Clear   COLOR UA  Yellow   SPEC GRAV UA  1 025   PH UA  5 5   GLUCOSE UA mg/dl Negative   KETONES UA mg/dl Negative   BLOOD UA  2+*   PROTEIN UA mg/dl Negative   NITRITE UA  Negative   BILIRUBIN UA  Negative   UROBILINOGEN UA E U /dl 0 2   LEUKOCYTES UA  Negative   WBC UA /hpf None Seen   RBC UA /hpf 0-1   BACTERIA UA /hpf Occasional   EPITHELIAL CELLS WET PREP /hpf Occasional     Results from last 7 days   Lab Units 04/25/23  1422   INFLUENZA A PCR  Negative   INFLUENZA B PCR  Negative   RSV PCR  Negative           Results from last 7 days   Lab Units 04/25/23  1600 04/25/23  1406   BLOOD CULTURE  No Growth at 24 hrs  No Growth at 24 hrs  Medications:   Scheduled Medications:  heparin (porcine), 5,000 Units, Subcutaneous, Q8H SUZY  hydrochlorothiazide, 25 mg, Oral, Daily  metoprolol succinate, 50 mg, Oral, Daily  vancomycin, 1,000 mg, Intravenous, Q8H      Continuous IV Infusions:     PRN Meds:  acetaminophen, 650 mg, Oral, Q6H PRN  methocarbamol, 500 mg, Oral, Q6H PRN        Discharge Plan: TBD    Network Utilization Review Department  ATTENTION: Please call with any questions or concerns to [phone] and carefully listen to the prompts so that you are directed to the right person   All voicemails are confidential   [facility] all requests for admission clinical reviews, approved or denied determinations and any other requests to dedicated fax number below belonging to the campus where the patient is receiving treatment   List of dedicated fax numbers for the Facilities:  [address] [place] [address] DENIALS (Administrative/Medical Necessity) [phone]   [address]  (Maternity/NICU/Pediatrics) [phone]   [address] [phone]   [address] [phone]   [address] [place] [address] [facility] [phone]   [address] [facility] [place] shauna Counts include 234 beds at the [facility] 134 [address] [facility] [phone]

## 2023-04-27 NOTE — DISCHARGE INSTR - AVS FIRST PAGE
You were treated and evaluated for abscess on your back, abscess was confirmed on CT scan  You were treated with IV antibiotics while inpatient, will need to continue oral antibiotics with doxycycline until 5/2/2023  Cardiology had evaluated you for elevated cardiac enzymes, which is most likely related to the infection and not anything cardiac  Recommend you follow-up with your cardiologist in [place] once you return home for outpatient stress test   Infectious disease was following, see additional recommendations below  Referral provided if needing to follow-up  Infectious disease recommendations:  Recommend applying mupirocin to anterior nares twice daily x 5 days   Recommend use of hibiclens bath every other day x 1-2 weeks and then decrease to twice weekly   Recommend use of bleach solution to wipe down bathroom/kitchen surfaces twice the first week and then weekly for 1 month  Recommend laundering bedding and clothing  Recommend not shaving skin in armpit, groin or legs and if he must shave, to first wash with Hibiclens and then use a fresh disposable razor for each shaving  Recommend hygiene measures including keeping fingernails cut short and clean; changing underwear, sleep wear, washcloths, and towels daily

## 2023-04-27 NOTE — ASSESSMENT & PLAN NOTE
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

## 2023-04-27 NOTE — ASSESSMENT & PLAN NOTE
· HS troponin: 736>003>342  EKG: sinus tachycardia,  with ST elevations but likely from early repolarization  · Likely non-ischemic myocardial injury in setting of sepsis  · ECHO: EF 05%, normal systolic and diastolic function  No diagnostic regional wall abnormality  No vegetations noted    · Cardiology consult:  · Recommend outpatient stress test with known cardiologist on discharge  · Notify provider if chest pain develops

## 2023-04-27 NOTE — CONSULTS
[facility]                                              Cardiology Consult  Jass Schroeder  47 y o  male   YOB: 1970 MRN: [id number]  Unit/Bed#: -01 Encounter: [id number]      Physician Requesting Consult: Balbir Aparicio*  Reason for Consult / Principal Problem: Elevated troponin    Assessments  Principal Problem:    Sepsis [facility])  Active Problems:    Essential hypertension    Cutaneous abscess of back excluding buttocks    Elevated troponin      Plan  Non-MI troponin elevation, Family h/o early CAD - father had stents in late 46s  · Hs-troponin 465, 435, 305  · Minimally elevated and downtrended  · No symptoms of chest pain or shortness of breath  · ECG with borderline early repolarization changes  · Echo done yesterday and personally reviewed  · LVEF 55%, mild LVH, no diagnostic regional wall motion abnormality but slightly limited visualization of lateral wall  · He does have some risk factors for CAD including a family history of early CAD in his father who had stents placed in late 46s  · Current minimal troponin elevation likely non-MI troponin elevation in the setting of skin infection/abscess, but no evidence of acute coronary syndrome  · He may very well have underlying chronic CAD, and this will need to be evaluated/risk stratified outpatient --> recommend outpatient exercise stress echocardiogram to further risk stratify this\  · He lives in [place] and plans to return there shortly --> will get this completed through his doctors in [place]  · Optimize risk factors  · Weight loss, exercise recommended  · Optimize lipids and blood pressure control outpatient  · Close follow up with PCP / cardiology outpatient    Discussed with primary medical service, ID service     Stable for discharge from cardiac standpoint with outpatient follow-up with PCP, with recommendations for outpatient stress testing at some point over the next few weeks     ECG: Personally reviewed  NSR, early repolarization changes    History of Present Illness   HPI: Aelah Novak  is a 47y o  year old male presented to the hospital on 4/25/2023 with a complaint of fever  He is currently visiting to this area from [facility], and during his stay he had developed an initial skin lesion in the back which progressed to an abscess  He received care at urgent care center outpatient over the last 2 weeks and has been on Bactrim for this  He subsequently had fever and night sweats and had drainage of the abscess at the urgent care  But the fevers continued for a few more days and as a result he finally presented to the ED per their instructions  He additionally had symptoms of headache, photophobia  He was again noted to have a fever while in the ED, and as a result was initiated on IV antibiotics  Blood cultures were up obtained and are negative at 24 hours  He underwent a CT of the spine as well and has been evaluated by ID  His initial lab testing revealed mild troponin elevation, which has since trended down  At no point in the past few weeks has he had any complaints of chest pain, shortness of breath, palpitations or dizziness  He remains active without any major change in exercise capacity recently  Due to the mild troponin elevation, ECGs and echocardiograms were obtained, none of which revealed any significant abnormalities  He was being prepared for discharge today, and cardiology was consulted at the last minute to help evaluate these mild troponin elevations  He continues to be asymptomatic otherwise  Past Medical History:   Diagnosis Date   • Hypertension      History reviewed  No pertinent surgical history  History reviewed  No pertinent family history    Meds/Allergies   all current active meds have been reviewed and current meds:   Current Facility-Administered Medications   Medication Dose Route Frequency   • acetaminophen (TYLENOL) tablet 650 mg  650 mg Oral Q6H PRN   • heparin (porcine) subcutaneous injection 5,000 Units  5,000 Units Subcutaneous Q8H [facility]   • hydrochlorothiazide (HYDRODIURIL) tablet 25 mg  25 mg Oral Daily   • methocarbamol (ROBAXIN) tablet 500 mg  500 mg Oral Q6H PRN   • metoprolol succinate (TOPROL-XL) 24 hr tablet 50 mg  50 mg Oral Daily   • vancomycin (VANCOCIN) IVPB (premix in dextrose) 1,000 mg 200 mL  1,000 mg Intravenous Q8H     Medications Prior to Admission   Medication   • hydrochlorothiazide (HYDRODIURIL) 25 mg tablet   • metoprolol succinate (TOPROL-XL) 50 mg 24 hr tablet     No Known Allergies  Social History     Socioeconomic History   • Marital status: Single     Spouse name: None   • Number of children: None   • Years of education: None   • Highest education level: None   Occupational History   • None   Tobacco Use   • Smoking status: Never   • Smokeless tobacco: Never   Substance and Sexual Activity   • Alcohol use: Not Currently   • Drug use: Yes     Types: Marijuana   • Sexual activity: None   Other Topics Concern   • None   Social History Narrative   • None     Social Determinants of Health     Financial Resource Strain: Not on file   Food Insecurity: No Food Insecurity   • Worried About Running Out of Food in the Last Year: Never true   • Ran Out of Food in the Last Year: Never true   Transportation Needs: No Transportation Needs   • Lack of Transportation (Medical): No   • Lack of Transportation (Non-Medical): No   Physical Activity: Not on file   Stress: Not on file   Social Connections: Not on file   Intimate Partner Violence: Not on file   Housing Stability: Unknown   • Unable to Pay for Housing in the Last Year: No   • Number of Places Lived in the Last Year: Not on file   • Unstable Housing in the Last Year: No         Review of Systems   All other systems reviewed and are negative          Vitals:    04/26/23 0700 04/26/23 1414 04/27/23 0046 04/27/23 0742   BP: 117/74 114/62 109/83 111/71 "  BP Location: Right arm Right arm Right arm Right arm   Pulse: 94 92 93 66   Resp: 20 20 16 20   Temp: 99 2 °F (37 3 °C) 98 3 °F (36 8 °C) 98 3 °F (36 8 °C) 97 6 °F (36 4 °C)   TempSrc: Oral Oral Oral Oral   SpO2: 95% 95% 94% 95%   Weight: 116 kg (255 lb)      Height: 5' 7\" (1 702 m)        Orthostatic Blood Pressures    Flowsheet Row Most Recent Value   Blood Pressure 111/71 filed at 04/27/2023 7206   Patient Position - Orthostatic VS Sitting filed at 04/27/2023 0742        Body mass index is 39 94 kg/m²  Wt Readings from Last 5 Encounters:   04/26/23 116 kg (255 lb)     I/O last 3 completed shifts: In: 18 [P O :946]  Out: -       Physical Exam  Vitals and nursing note reviewed  Constitutional:       General: He is not in acute distress  Appearance: He is well-developed  He is obese  He is not ill-appearing or diaphoretic  HENT:      Head: Normocephalic and atraumatic  Nose: No congestion  Eyes:      General: No scleral icterus  Conjunctiva/sclera: Conjunctivae normal    Neck:      Vascular: No carotid bruit or JVD  Cardiovascular:      Rate and Rhythm: Normal rate and regular rhythm  Heart sounds: Normal heart sounds  No murmur heard  No friction rub  No gallop  Pulmonary:      Effort: Pulmonary effort is normal  No respiratory distress  Breath sounds: Normal breath sounds  No wheezing or rales  Chest:      Chest wall: No tenderness  Abdominal:      General: There is no distension  Palpations: Abdomen is soft  Tenderness: There is no abdominal tenderness  Musculoskeletal:         General: No swelling, tenderness or deformity  Cervical back: Neck supple  No muscular tenderness  Right lower leg: No edema  Left lower leg: No edema  Skin:     General: Skin is warm  Findings: Lesion present  Comments: Mid-back skin lesion noted  Neurological:      General: No focal deficit present        Mental Status: He is alert and oriented to " person, place, and time  Mental status is at baseline  Psychiatric:         Mood and Affect: Mood normal          Behavior: Behavior normal          Thought Content: Thought content normal            Labs:  Results from last 7 days   Lab Units 04/27/23  0458 04/26/23  0506 04/25/23  1405   WBC Thousand/uL 8 67 7 77 10 12   HEMOGLOBIN g/dL 12 8 12 8 14 4   HEMATOCRIT % 38 0 38 2 42 1   RDW % 12 8 12 9 12 9   PLATELETS Thousands/uL 322 265 311     Results from last 7 days   Lab Units 04/27/23  0500 04/26/23  0506 04/25/23  1405   POTASSIUM mmol/L 3 4* 3 5 3 5   CHLORIDE mmol/L 97 100 94*   CO2 mmol/L 27 25 27   BUN mg/dL 12 13 15   CREATININE mg/dL 0 73 0 74 0 99   CALCIUM mg/dL 9 0 8 4 9 7   AST U/L 26  --  29   ALT U/L 35  --  32   ALK PHOS U/L 43  --  49             Results from last 7 days   Lab Units 04/25/23  1405   INR  1 25*             Imaging: XR chest portable    Result Date: 4/26/2023  Narrative: CHEST INDICATION:   Sepsis  COMPARISON: Thoracic spine CT from today  EXAM PERFORMED/VIEWS:  XR CHEST PORTABLE  FINDINGS: Cardiomediastinal silhouette normal  Lungs clear  No effusion or pneumothorax  Upper abdomen normal  Bones normal for age  Impression: No acute cardiopulmonary disease  Workstation performed: [id number]     CT spine thoracic w contrast    Result Date: 4/25/2023  Narrative: CT THORACIC SPINE INDICATION:   Midline Thoracic Abscess around T-10-12   COMPARISON:  Same day portable chest radiograph  TECHNIQUE:  Contiguous axial images were obtained  Sagittal and coronal reconstructions were performed  Radiation dose length product (DLP) for this visit:  1092 mGy-cm   This examination, like all CT scans performed in the [facility], was performed utilizing techniques to minimize radiation dose exposure, including the use of iterative reconstruction and automated exposure control  IMAGE QUALITY:  Diagnostic    Superior aspect of T1 vertebral body was not not fully included in the field-of-view  FINDINGS: ALIGNMENT:  Mild dextroscoliosis of thoracic spine  No listhesis  VERTEBRAE:  No acute fracture of thoracic spine  Chronic L2 superior endplate compression fracture with mild height loss  DEGENERATIVE CHANGES:  Mild multilevel degenerative disc disease  No critical central canal stenosis or high-grade foraminal narrowing  PARASPINAL SOFT TISSUES:  Subcutaneous fat stranding in the midline back at approximately T10 level  Tiny cutaneous abscess in midline T10 level (203:58)  OTHER: Small right renal cyst      Impression: Superior aspect of T1 vertebral body was not not fully included in the field-of-view  - No acute osseous abnormality of thoracic spine  - Tiny cutaneous abscess in midline T10 level with mild adjacent subcutaneous cellulitis  Recommend direct visualization for further evaluation  - Chronic L2 superior endplate compression fracture with mild height loss  - Additional chronic/incidental findings as detailed above  The study was marked in [facility] for immediate notification  Workstation performed: [id number]     Echo complete w/ contrast if indicated    Result Date: 4/26/2023  Narrative: •  Left Ventricle: Left ventricular cavity size is normal  Wall thickness is mildly increased  There is mild concentric hypertrophy  The left ventricular ejection fraction is 55%  Systolic function is normal  No diagnostic regional wall motion abnormality was identified  Limited sensitivity  Diastolic function is normal        Cardiac testing:   No results found for this or any previous visit  No results found for this or any previous visit  No results found for this or any previous visit  No results found for this or any previous visit

## 2023-04-27 NOTE — PLAN OF CARE
Problem: PAIN - ADULT  Goal: Verbalizes/displays adequate comfort level or baseline comfort level  Description: Interventions:  - Encourage patient to monitor pain and request assistance  - Assess pain using appropriate pain scale  - Administer analgesics based on type and severity of pain and evaluate response  - Implement non-pharmacological measures as appropriate and evaluate response  - Consider cultural and social influences on pain and pain management  - Notify physician/advanced practitioner if interventions unsuccessful or patient reports new pain  Outcome: Progressing     Problem: INFECTION - ADULT  Goal: Absence or prevention of progression during hospitalization  Description: INTERVENTIONS:  - Assess and monitor for signs and symptoms of infection  - Monitor lab/diagnostic results  - Monitor all insertion sites, i e  indwelling lines, tubes, and drains  - Monitor endotracheal if appropriate and nasal secretions for changes in amount and color  - [place] appropriate cooling/warming therapies per order  - Administer medications as ordered  - Instruct and encourage patient and family to use good hand hygiene technique  - Identify and instruct in appropriate isolation precautions for identified infection/condition  Outcome: Progressing

## 2023-04-27 NOTE — DISCHARGE SUMMARY
Shadia [address]  [facility]  1970, 47 y [facility]  male MRN: [id number]  Unit/Bed#: -Maddie Encounter: [id number]  Primary Care Provider: No primary care provider on file  Date and time admitted to hospital: 4/25/2023  1:33 PM    * Sepsis [facility])  Assessment & Plan  POA as evidenced by fever, tachycardia  Tmax on arrival 101 8  Presented with cutaneous abscess of back, treated with Bactrim and I&D on 4/21/23 at [facility]  Fevers initially improved, however now with persistent high fevers  Reports hx MRSA skin infections  Also complaining of HA, neck pain  · Neck pain w/ flexion on arrival to ED, since resolved  Negative Kernig/Brudzinksi, GCS 15 wo focal deficit  · Procal: 0 53->0 23 / Lactic acid: wnl  · BC x2: NGTD (24 hrs)  · Patient clinically improved, fever free for 24 hours  · ID following:  · Started on IV Rocephin/ampicillin/vancomycin for meningitis coverage, transitioned to IV vancomycin  · Discharge with PO doxycycline through til 5/2/23  · Hibiclens BID for wound care, mupirocin ointment in nares  · Trend WBCs/fever curve/procal    Cutaneous abscess of back excluding buttocks  Assessment & Plan  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, refractory to Bactrim  · CT thoracic spine revealing tiny cutaneous abscess with surrounding mild cellulitis   · Antibiotics as above  · Analgesia PRN    Elevated troponin  Assessment & Plan  · HS troponin: [id number]  EKG: sinus tachycardia,  with ST elevations but likely from early repolarization  · Likely non-ischemic myocardial injury in setting of sepsis  · ECHO: EF 51%, normal systolic and diastolic function  No diagnostic regional wall abnormality  No vegetations noted    · Cardiology consult:  · Recommend outpatient stress test with known cardiologist on discharge  · Notify provider if chest pain develops    Essential hypertension  Assessment & Plan  · BP reviewed, overall stable   · Continue HCTZ, Toprol    Medical Problems     Resolved Problems  Date Reviewed: 4/27/2023   None       Discharging Physician / Practitioner: Joy Qureshi PA-C  PCP: No primary care provider on file  Admission Date:   Admission Orders (From admission, onward)     Ordered        04/25/23 [address]  Once                      Discharge Date: 04/27/23    Consultations During Hospital Stay:  · Cardiology  · Infectious disease    Procedures Performed:   · None    Significant Findings / Test Results:   · CT thoracic spine: Superior aspect of T1 vertebral body was not not fully included in the field-of-view  No acute osseous abnormality of thoracic spine  Tiny cutaneous abscess in midline T10 level with mild adjacent subcutaneous cellulitis  Recommend direct visualization for further evaluation  Chronic L2 superior endplate compression fracture with mild height loss  Incidental Findings:   · None    Test Results Pending at Discharge (will require follow up): · Final blood culture results     Outpatient Tests Requested:  · Follow-up with known cardiologist in [place], to obtain outpatient stress test  · Follow-up with infectious disease outpatient if carbuncle recurs    Complications: None    Reason for Admission: Abscess    Hospital Course: Artemus Ganser  is a 47 y o  male patient, PMH HTN, previous MRSA infections, who originally presented to the hospital on 4/25/2023 due to sepsis from recurrent back carbuncle/abscess  Patient recently treated with Bactrim and I&D on 4/21/2023 at [facility] for cutaneous abscess of back, presented with persistently high fevers  On arrival to ED, patient met sepsis criteria, CT thoracic spine showed tiny cutaneous abscess with mild surrounding cellulitis  Patient was initially started on IV Rocephin/ampicillin/vancomycin for meningitis coverage  Patient's fevers resolved, no neurological deficits concerning for meningitis   ID consulted, "can continue oral doxycycline through till 5/2/2023 for total 7-day antibiotic course  Patient also had elevated troponins that trended down, likely non-MI elevation in setting of sepsis  Cardiology consulted, ECHO was normal without evidence of vegetations, recommend outpatient stress test with known cardiologist in [facility] outpatient  Please see above list of diagnoses and related plan for additional information  Condition at Discharge: stable    Discharge Day Visit / Exam:   Subjective: Patient is seen at bedside this a m , denies neck pain, headaches, lightheadedness/dizziness, visual disturbances, fever, chills, sweats, nausea/vomiting, chest pain  Patient states he feels ready for discharge home  Vitals: Blood Pressure: 111/71 (04/27/23 0742)  Pulse: 66 (04/27/23 0742)  Temperature: 97 6 °F (36 4 °C) (04/27/23 0742)  Temp Source: Oral (04/27/23 0742)  Respirations: 20 (04/27/23 0742)  Height: 5' 7\" (170 2 cm) (04/26/23 0700)  Weight - Scale: 116 kg (255 lb) (04/26/23 0700)  SpO2: 95 % (04/27/23 0742)  Exam:   Physical Exam  Constitutional:       General: He is not in acute distress  Appearance: He is not ill-appearing, toxic-appearing or diaphoretic  Cardiovascular:      Rate and Rhythm: Normal rate and regular rhythm  Pulses: Normal pulses  Heart sounds: Normal heart sounds  Pulmonary:      Effort: Pulmonary effort is normal  No respiratory distress  Breath sounds: Normal breath sounds  Abdominal:      General: Bowel sounds are normal  There is no distension  Palpations: Abdomen is soft  Tenderness: There is no abdominal tenderness  Musculoskeletal:         General: No swelling or tenderness  Skin:     General: Skin is warm  Findings: No erythema or rash  Comments: Small thoracic lesion, mild surrounding erythema without red streaking, fluctuance or tenderness palpation  No active drainage  Neurological:      General: No focal deficit present   " Mental Status: He is alert  Psychiatric:         Mood and Affect: Mood normal          Behavior: Behavior normal          Discussion with Family: Patient declined call to   Discharge instructions/Information to patient and family:   See after visit summary for information provided to patient and family  Provisions for Follow-Up Care:  See after visit summary for information related to follow-up care and any pertinent home health orders  Disposition:   Home    Planned Readmission: None     Discharge Statement:  I spent 55 minutes discharging the patient  This time was spent on the day of discharge  I had direct contact with the patient on the day of discharge  Greater than 50% of the total time was spent examining patient, answering all patient questions, arranging and discussing plan of care with patient as well as directly providing post-discharge instructions  Additional time then spent on discharge activities  Discharge Medications:  See after visit summary for reconciled discharge medications provided to patient and/or family        **Please Note: This note may have been constructed using a voice recognition system**

## 2023-04-27 NOTE — PROGRESS NOTES
Progress Note - Infectious Disease   Caryle Rattler  47 y o  male MRN: [id number]  Unit/Bed#: -Maddie Encounter: [id number]      Impression/Plan:  1  SIRS, POA with fever and tachycardia  In setting of patient developing nightly fever/night sweats starting 4/20 on D4/5 of Bactrim for #2  Patient discontinued Bactrim 4/23/23  Admission blood cultures negative to date  No fever, sweats overnight   -antibiotics as below  -follow-up blood cultures  -monitor temperature and hemodynamics     2  MRSA Thoracic back carbuncle  S/p lidocaine injection followed by spontaneous drainage 4/21/23 at [facility] with wound culture MRSA sensitive to tetracycline  H/O MRSA boils 2022  Interested in decolonization regimen  No tick exposure  Lives in [place], visiting mom here in [place] [facility]  4/25/23 CT thoracic spine with tiny cutaneous abscess at T10 level with mild adjacent subc cellulitis   -discontinue IV Vancomycin   -transition to Doxycycline to complete 1 week course from spontaneous drainage opening through 5/2/23     3  MRSA boils recurrence since 2022  MRSA Colonization  Risk factor for recurrent infection  Recommend protocol as noted below as outpatient:  -patient interested in MRSA decolonization regimen upon discharge  Discussed below in detail  Hibiclens and mupirocin being ordered to the [address] [place] [address]  1  Recommend applying mupirocin to anterior nares twice daily x 5 days   2  Recommend use of hibiclens bath every other day x 1-2 weeks and then decrease to twice weekly   3  Recommend use of bleach solution to wipe down bathroom/kitchen surfaces twice the first week and then weekly for 1 month  4  Recommend laundering bedding and clothing  5  Recommend not shaving skin in armpit, groin or legs and if he must shave, to first wash with Hibiclens and then use a fresh disposable razor for each shaving  6   Recommend hygiene measures including keeping fingernails cut short and clean; changing underwear, sleep wear, washcloths, and towels daily     4  Possible Bactrim Drug fever  Monitoring on Vancomycin  Has tolerated Doxycycline for #3 prior  -avoid Bactrim  -monitor antibiotic tolerance     Antibiotics:  Vancomycin D3     Above impression and plan discussed in detail with patient, RN, and Anne Al of primary care team   I have spent a total time of 50 minutes on 23 in caring for this patient including Diagnostic results, Patient and family education, Importance of tx compliance, Risk factor reductions, Impressions, Counseling / Coordination of care, Documenting in the medical record, Reviewing / ordering tests, medicine, procedures  , Obtaining or reviewing history   and Communicating with other healthcare professionals   Subjective:  Patient has no fever, chills, sweats overnight; no nausea, vomiting, diarrhea; no cough, shortness of breath; no back pain  IV site is bothering him and he declined 3 am Vancomycin dose due to burning with infusion of Vancomycin  Objective:  Vitals:  Temp:  [97 6 °F (36 4 °C)-98 3 °F (36 8 °C)] 97 6 °F (36 4 °C)  HR:  [66-93] 66  Resp:  [16-20] 20  BP: (109-114)/(62-83) 111/71  SpO2:  [94 %-95 %] 95 %  Temp (24hrs), Av 1 °F (36 7 °C), Min:97 6 °F (36 4 °C), Max:98 3 °F (36 8 °C)  Current: Temperature: 97 6 °F (36 4 °C)    Physical Exam:   General Appearance:  47year old pleasant male, ambulatory in room, nontoxic appearance, no acute distress  HEENT: Atraumatic normocephalic   Throat: Oropharynx moist     Pulmonary:   Normal respiratory excursion without accessory muscle use   Cardiac:  RRR   Abdomen:   Soft, non-tender, non-distended   Extremities: No edema   : No wheeler, no SPT   Psychiatric: Awake, cooperative   Skin: No new rashes  mild cord at Left AC/arm IV site with mild tenderness  IV site removed personally         Labs, Imaging, & Other studies:   All pertinent labs and imaging studies were personally reviewed  Results from last 7 days   Lab Units 04/27/23  0458 04/26/23  0506 04/25/23  1405   WBC Thousand/uL 8 67 7 77 10 12   HEMOGLOBIN g/dL 12 8 12 8 14 4   PLATELETS Thousands/uL 322 265 311     Results from last 7 days   Lab Units 04/27/23  0500 04/26/23  0506 04/25/23  1405   SODIUM mmol/L 135 134* 130*   POTASSIUM mmol/L 3 4* 3 5 3 5   CHLORIDE mmol/L 97 100 94*   CO2 mmol/L 27 25 27   BUN mg/dL 12 13 15   CREATININE mg/dL 0 73 0 74 0 99   EGFR ml/min/1 73sq m 105 105 86   CALCIUM mg/dL 9 0 8 4 9 7   AST U/L 26  --  29   ALT U/L 35  --  32   ALK PHOS U/L 43  --  49     Results from last 7 days   Lab Units 04/25/23  1600 04/25/23  1406   BLOOD CULTURE  No Growth at 24 hrs  No Growth at 24 hrs       Results from last 7 days   Lab Units 04/27/23  0500 04/25/23  1405   PROCALCITONIN ng/ml 0 23 0 53*

## 2023-04-27 NOTE — ASSESSMENT & PLAN NOTE
POA as evidenced by fever, tachycardia  Tmax on arrival 101 8  Presented with cutaneous abscess of back, treated with Bactrim and I&D on 4/21/23 at [facility]  Fevers initially improved, however now with persistent high fevers  Reports hx MRSA skin infections  Also complaining of HA, neck pain  · Neck pain w/ flexion on arrival to ED, since resolved  Negative Kernig/Brudzinksi, GCS 15 wo focal deficit     · Procal: 0 53->0 23 / Lactic acid: wnl  · BC x2: NGTD (24 hrs)  · Patient clinically improved, fever free for 24 hours  · ID following:  · Started on IV Rocephin/ampicillin/vancomycin for meningitis coverage, transitioned to IV vancomycin  · Discharge with PO doxycycline through til 5/2/23  · Hibiclens BID for wound care, mupirocin ointment in nares  · Trend WBCs/fever curve/procal

## 2023-04-28 ENCOUNTER — TELEPHONE (OUTPATIENT)
Dept: INFECTIOUS DISEASES | Facility: CLINIC | Age: 53
End: 2023-04-28

## 2023-04-28 NOTE — TELEPHONE ENCOUNTER
Patient calls the office today regarding instructions on medication  Patient states he was prescribed Chlorhexidine 4%  Patient states before he was discharged they have him instructions  Patient has since pick this up from the pharmacy and the instructions are different  Patient is requesting a call back for instruction clarification       CB:[phone]

## 2023-04-28 NOTE — UTILIZATION REVIEW
NOTIFICATION OF ADMISSION DISCHARGE   This is a Notification of Discharge from [address]  Please be advised that this patient has been discharge from our facility  Below you will find the admission and discharge date and time including the patient’s disposition  UTILIZATION REVIEW CONTACT:  [address] [place]  Utilization   Network Utilization Review Department  Phone: [phone] x carefully listen to the prompts  All voicemails are confidential   Email: [email or web address]     ADMISSION INFORMATION  PRESENTATION DATE: 4/25/2023  1:33 PM  OBERVATION ADMISSION DATE:   INPATIENT ADMISSION DATE: 4/25/23  5:17 PM   DISCHARGE DATE: 4/27/2023  1:58 PM   DISPOSITION:Home/Self Care    IMPORTANT INFORMATION:  Send all requests for admission clinical reviews, approved or denied determinations and any other requests to dedicated fax number below belonging to the campus where the patient is receiving treatment   List of dedicated fax numbers:  [address] DENIALS (Administrative/Medical Necessity) [phone]   [address] (Maternity/NICU/Pediatrics) [phone]   [facility] [phone]   [place] [address] [phone]   [address] [phone]   220 [facility][note 3] [phone] 90 [facility] [phone]   [address] [place] [address] 119 [phone]   [facility]  [phone]   4054 [facility] [phone] 412 [facility] 850 [facility] [phone]

## 2023-04-30 LAB
BACTERIA BLD CULT: NORMAL
BACTERIA BLD CULT: NORMAL

## 2023-05-01 NOTE — TELEPHONE ENCOUNTER
Discussed Hibclens and mupirocin decolonization instructions in detail   Patient in great spirits and super appreciative for clarification

## 2023-06-26 PROBLEM — A41.9 SEPSIS (HCC): Status: RESOLVED | Noted: 2023-04-25 | Resolved: 2023-06-26

## 2023-12-02 ENCOUNTER — HOSPITAL ENCOUNTER (EMERGENCY)
Facility: HOSPITAL | Age: 53
Discharge: HOME | End: 2023-12-02
Attending: EMERGENCY MEDICINE | Admitting: EMERGENCY MEDICINE
Payer: COMMERCIAL

## 2023-12-02 VITALS
DIASTOLIC BLOOD PRESSURE: 90 MMHG | HEART RATE: 115 BPM | OXYGEN SATURATION: 98 % | SYSTOLIC BLOOD PRESSURE: 165 MMHG | TEMPERATURE: 97.8 F | BODY MASS INDEX: 42.44 KG/M2 | WEIGHT: 280 LBS | HEIGHT: 68 IN | RESPIRATION RATE: 20 BRPM

## 2023-12-02 DIAGNOSIS — M79.609 POPLITEAL PAIN: Primary | ICD-10-CM

## 2023-12-02 LAB
ANION GAP SERPL CALCULATED.3IONS-SCNC: 8 MMOL/L
BUN SERPL-MCNC: 18 MG/DL (ref 5–25)
CALCIUM SERPL-MCNC: 9.5 MG/DL (ref 8.4–10.2)
CHLORIDE SERPL-SCNC: 101 MMOL/L (ref 96–108)
CK SERPL-CCNC: 272 U/L (ref 39–308)
CO2 SERPL-SCNC: 27 MMOL/L (ref 21–32)
CREAT SERPL-MCNC: 0.94 MG/DL (ref 0.6–1.3)
D DIMER PPP FEU-MCNC: 0.3 UG/ML FEU
GFR SERPL CREATININE-BSD FRML MDRD: 91 ML/MIN/1.73SQ M
GLUCOSE SERPL-MCNC: 105 MG/DL (ref 65–140)
MAGNESIUM SERPL-MCNC: 2 MG/DL (ref 1.9–2.7)
POTASSIUM SERPL-SCNC: 3.9 MMOL/L (ref 3.5–5.3)
SODIUM SERPL-SCNC: 136 MMOL/L (ref 135–147)

## 2023-12-02 PROCEDURE — 83735 ASSAY OF MAGNESIUM: CPT | Performed by: EMERGENCY MEDICINE

## 2023-12-02 PROCEDURE — 36415 COLL VENOUS BLD VENIPUNCTURE: CPT | Performed by: EMERGENCY MEDICINE

## 2023-12-02 PROCEDURE — 82550 ASSAY OF CK (CPK): CPT | Performed by: EMERGENCY MEDICINE

## 2023-12-02 PROCEDURE — 99283 EMERGENCY DEPT VISIT LOW MDM: CPT

## 2023-12-02 PROCEDURE — 80048 BASIC METABOLIC PNL TOTAL CA: CPT | Performed by: EMERGENCY MEDICINE

## 2023-12-02 PROCEDURE — 85379 FIBRIN DEGRADATION QUANT: CPT | Performed by: EMERGENCY MEDICINE

## 2023-12-02 PROCEDURE — 99284 EMERGENCY DEPT VISIT MOD MDM: CPT | Performed by: EMERGENCY MEDICINE

## 2023-12-02 RX ORDER — ACETAMINOPHEN 325 MG/1
975 TABLET ORAL ONCE
Status: DISCONTINUED | OUTPATIENT
Start: 2023-12-02 | End: 2023-12-02 | Stop reason: HOSPADM

## 2023-12-02 RX ORDER — NAPROXEN 250 MG/1
500 TABLET ORAL ONCE
Status: COMPLETED | OUTPATIENT
Start: 2023-12-02 | End: 2023-12-02

## 2023-12-02 RX ORDER — ACETAMINOPHEN 500 MG
1000 TABLET ORAL EVERY 6 HOURS PRN
Qty: 40 TABLET | Refills: 0 | Status: SHIPPED | OUTPATIENT
Start: 2023-12-02

## 2023-12-02 RX ADMIN — NAPROXEN 500 MG: 250 TABLET ORAL at 11:52

## 2023-12-02 NOTE — DISCHARGE INSTRUCTIONS
Apply ace wrap daily. Use tylenol and voltaren as needed for pain. Follow up with orthopedic surgery.

## 2023-12-02 NOTE — ED NOTES
D/c reviewed with pt. Pt verbalized understanding and has no further questions at this time. Pt ambulatory off unit with steady gait.      Janki Hanna RN  12/02/23 8868

## 2023-12-02 NOTE — ED PROVIDER NOTES
History  Chief Complaint   Patient presents with    Knee Pain     Pt reports pain behind left knee down to ankle x 1 week; pt reports that he was in car for extended period of time over Thanksgiving; family hx of blood clots     47 y/o male hx of HTN, obesity presenting with pain in the L poppliteal recess for last week. Radiates to calf. No CP, SOB or other symptoms. Worse with ambulation. Recently traveled from [place]. Family hx of Bakers cysts and DVT. Knee Pain  Lower extremity pain location: R popliteal recess. Pain details:     Quality:  Aching    Onset quality:  Gradual    Timing:  Constant    Progression:  Unchanged  Chronicity:  New  Dislocation: no    Foreign body present:  No foreign bodies  Prior injury to area:  No  Relieved by:  Nothing  Worsened by:  Nothing  Ineffective treatments:  None tried      Prior to Admission Medications   Prescriptions Last Dose Informant Patient Reported? Taking?   chlorhexidine (HIBICLENS) 4 % external liquid   No No   Sig: Apply 1 application. topically every other day Apply to back wound   hydrochlorothiazide (HYDRODIURIL) 25 mg tablet   Yes No   Sig: Take 25 mg by mouth daily   metoprolol succinate (TOPROL-XL) 50 mg 24 hr tablet  Self Yes No   Sig: Take 50 mg by mouth daily   mupirocin (BACTROBAN) 2 % ointment   No No   Sig: Apply topically 2 (two) times a day Apply to nares      Facility-Administered Medications: None       Past Medical History:   Diagnosis Date    Hypertension        History reviewed. No pertinent surgical history. History reviewed. No pertinent family history. I have reviewed and agree with the history as documented. E-Cigarette/Vaping     E-Cigarette/Vaping Substances     Social History     Tobacco Use    Smoking status: Never    Smokeless tobacco: Never   Substance Use Topics    Alcohol use: Not Currently    Drug use: Yes     Types: Marijuana       Review of Systems   Cardiovascular:  Negative for leg swelling.    All other systems reviewed and are negative. Physical Exam  Physical Exam  Vitals and nursing note reviewed. Constitutional:       General: He is not in acute distress. Appearance: He is well-developed. He is not diaphoretic. HENT:      Head: Normocephalic and atraumatic. Right Ear: External ear normal.      Left Ear: External ear normal.   Eyes:      Conjunctiva/sclera: Conjunctivae normal.   Neck:      Trachea: No tracheal deviation. Cardiovascular:      Rate and Rhythm: Normal rate and regular rhythm. Heart sounds: Normal heart sounds. No murmur heard. Pulmonary:      Effort: No respiratory distress. Breath sounds: Normal breath sounds. No stridor. No wheezing or rales. Abdominal:      General: Bowel sounds are normal. There is no distension. Palpations: Abdomen is soft. There is no mass. Tenderness: There is no abdominal tenderness. There is no guarding or rebound. Musculoskeletal:         General: Tenderness present. No deformity. Comments: B/l calves symmetrical. 40.5 cm bilaterally. Pain on palpation of L popliteal recess. No pain on palpation of calf or calf fullness   Skin:     General: Skin is dry. Findings: No rash. Neurological:      Motor: No abnormal muscle tone. Coordination: Coordination normal.   Psychiatric:         Behavior: Behavior normal.         Thought Content:  Thought content normal.         Judgment: Judgment normal.         Vital Signs  ED Triage Vitals [12/02/23 1132]   Temperature Pulse Respirations Blood Pressure SpO2   97.8 °F (36.6 °C) (!) 115 20 165/90 98 %      Temp Source Heart Rate Source Patient Position - Orthostatic VS BP Location FiO2 (%)   Oral Monitor Sitting Right arm --      Pain Score       5           Vitals:    12/02/23 1132   BP: 165/90   Pulse: (!) 115   Patient Position - Orthostatic VS: Sitting         Visual Acuity      ED Medications  Medications   acetaminophen (TYLENOL) tablet 975 mg (975 mg Oral Not Given 12/2/23 1152)   naproxen (NAPROSYN) tablet 500 mg (500 mg Oral Given 12/2/23 1152)       Diagnostic Studies  Results Reviewed       Procedure Component Value Units Date/Time    CK [[id number]]  (Normal) Collected: 12/02/23 1156    Lab Status: Final result Specimen: Blood from Arm, Left Updated: 12/02/23 1216     Total  U/L     Basic metabolic panel [[id number]] Collected: 12/02/23 1156    Lab Status: Final result Specimen: Blood from Arm, Left Updated: 12/02/23 1216     Sodium 136 mmol/L      Potassium 3.9 mmol/L      Chloride 101 mmol/L      CO2 27 mmol/L      ANION GAP 8 mmol/L      BUN 18 mg/dL      Creatinine 0.94 mg/dL      Glucose 105 mg/dL      Calcium 9.5 mg/dL      eGFR 91 ml/min/1.73sq m     Narrative:      [facility] guidelines for Chronic Kidney Disease (CKD):     Stage 1 with normal or high GFR (GFR > 90 mL/min/1.73 square meters)    Stage 2 Mild CKD (GFR = 60-89 mL/min/1.73 square meters)    Stage 3A Moderate CKD (GFR = 45-59 mL/min/1.73 square meters)    Stage 3B Moderate CKD (GFR = 30-44 mL/min/1.73 square meters)    Stage 4 Severe CKD (GFR = 15-29 mL/min/1.73 square meters)    Stage 5 End Stage CKD (GFR <15 mL/min/1.73 square meters)  Note: GFR calculation is accurate only with a steady state creatinine    Magnesium [[id number]]  (Normal) Collected: 12/02/23 1156    Lab Status: Final result Specimen: Blood from Arm, Left Updated: 12/02/23 1216     Magnesium 2.0 mg/dL     D-dimer, quantitative [[id number]]  (Normal) Collected: 12/02/23 1156    Lab Status: Final result Specimen: Blood from Arm, Left Updated: 12/02/23 1211     D-Dimer, Quant 0.30 ug/ml FEU     Narrative: In the evaluation for possible pulmonary embolism, in the appropriate (Well's Score of 4 or less) patient, the age adjusted d-dimer cutoff for this patient can be calculated as:    Age x 0.01 (in ug/mL) for Age-adjusted D-dimer exclusion threshold for a patient over 50 years.                    No orders to display              Procedures  Procedures         ED Course  ED Course as of 12/02/23 1226   Sat Dec 02, 2023   1213 D-Dimer, Quant: 0.30                               SBIRT 22yo+      Jah Puga Most Recent Value   Initial Alcohol Screen: US AUDIT-C     1. How often do you have a drink containing alcohol? 0 Filed at: 12/02/2023 1135   2. How many drinks containing alcohol do you have on a typical day you are drinking? 0 Filed at: 12/02/2023 1135   3a. Male UNDER 65: How often do you have five or more drinks on one occasion? 0 Filed at: 12/02/2023 1135   3b. FEMALE Any Age, or MALE 65+: How often do you have 4 or more drinks on one occassion? 0 Filed at: 12/02/2023 1135   Audit-C Score 0 Filed at: 12/02/2023 1135   FRANCISCO JAVIER: How many times in the past year have you. .. Used an illegal drug or used a prescription medication for non-medical reasons? Never Filed at: 12/02/2023 1135              Wells' Criteria for DVT      Flowsheet Row Most Recent Value   Wells' Criteria for DVT    Active cancer Treatment or palliation within 6 months 0 Filed at: 12/02/2023 1214   Bedridden recently >3 days or major surgery within 12 weeks 0 Filed at: 12/02/2023 1214   Calf swelling >3 cm compared to the other leg 0 Filed at: 12/02/2023 1214   Entire leg swollen 0 Filed at: 12/02/2023 1214   Collateral (nonvaricose) superficial veins present 0 Filed at: 12/02/2023 1214   Localized tenderness along the deep venous system 0 Filed at: 12/02/2023 1214   Pitting edema, confined to symptomatic leg 0 Filed at: 12/02/2023 1214   Paralysis, paresis, or recent plaster immobilization of the lower extremity 0 Filed at: 12/02/2023 1214   Previously documented DVT 0 Filed at: 12/02/2023 1214   Alternative diagnosis to DVT as likely or more likely 2 Filed at: 12/02/2023 1214   Wells DVT Critera Score -2 Filed at: 12/02/2023 1214                Medical Decision Making  Popliteal pain on the left. Wells score -3.  Likely baker's cyst. Will eval with d dimer. Normal. Not DVT. Will eval electrolyte abnormality and myopathy. No significant findings. Will d/c home. Follow with ortho. RTED precautions given. Amount and/or Complexity of Data Reviewed  Labs: ordered. Risk  OTC drugs. Prescription drug management. Disposition  Final diagnoses:   Popliteal pain     Time reflects when diagnosis was documented in both MDM as applicable and the Disposition within this note       Time User Action Codes Description Comment    12/2/2023 12:19 PM Dayna Benitez Add [Y16.047] Popliteal pain           ED Disposition       ED Disposition   Discharge    Condition   Stable    Date/Time   Sat Dec 2, 2023 1219    [address]. discharge to home/self care.                    Follow-up Information       Follow up With Specialties Details Why Contact Info Additional [address] [facility] Orthopedic Surgery Schedule an appointment as soon as possible for a visit  For re-evaluation as soon as possible [place] [phone] [address] [facility] [address] [facility] [address] [facility] [phone] [address] Emergency Department Emergency Medicine  If symptoms worsen [place] [phone] [address] [facility], [address] [place] [address], [address] [phone]            Patient's Medications   Discharge Prescriptions    ACETAMINOPHEN (TYLENOL) 500 MG TABLET    Take 2 tablets (1,000 mg total) by mouth every 6 (six) hours as needed for moderate pain       Start Date: 12/2/2023 End Date: --       Order Dose: 1,000 mg       Quantity: 40 tablet    Refills: 0    DICLOFENAC SODIUM (VOLTAREN) 1 %    Apply 2 g topically 4 (four) times a day       Start Date: 12/2/2023 End Date: --       Order Dose: 2 g       Quantity: 100 g    Refills: 0       No discharge procedures on file.    PDMP Review       None            ED Provider  Electronically Signed by             Jobie Closs, DO  12/02/23 0147

## 2024-05-10 ENCOUNTER — HOSPITAL ENCOUNTER (OUTPATIENT)
Dept: NON INVASIVE DIAGNOSTICS | Facility: HOSPITAL | Age: 54
Discharge: HOME | End: 2024-05-10
Attending: INTERNAL MEDICINE
Payer: COMMERCIAL

## 2024-05-10 ENCOUNTER — HOSPITAL ENCOUNTER (OUTPATIENT)
Dept: RADIOLOGY | Facility: HOSPITAL | Age: 54
End: 2024-05-10
Payer: COMMERCIAL

## 2024-05-10 ENCOUNTER — HOSPITAL ENCOUNTER (OUTPATIENT)
Dept: VASCULAR ULTRASOUND | Facility: HOSPITAL | Age: 54
Discharge: HOME | End: 2024-05-10
Attending: INTERNAL MEDICINE
Payer: COMMERCIAL

## 2024-05-10 VITALS — HEIGHT: 68 IN | BODY MASS INDEX: 42.44 KG/M2 | WEIGHT: 280 LBS

## 2024-05-10 DIAGNOSIS — I87.2 VENOUS INSUFFICIENCY (CHRONIC) (PERIPHERAL): ICD-10-CM

## 2024-05-10 DIAGNOSIS — M17.12 OSTEOARTHRITIS OF LEFT KNEE, UNSPECIFIED OSTEOARTHRITIS TYPE: ICD-10-CM

## 2024-05-10 DIAGNOSIS — I25.10 ATHEROSCLEROTIC HEART DISEASE OF NATIVE CORONARY ARTERY WITHOUT ANGINA PECTORIS: ICD-10-CM

## 2024-05-10 LAB
ARRHY DURING EX: NORMAL
CHEST PAIN STATEMENT: NORMAL
MAX DIASTOLIC BP: 80 MMHG
MAX HR PERCENT: 89 %
MAX HR: 148 BPM
MAX PREDICTED HEART RATE: 166 BPM
PROTOCOL NAME: NORMAL
RATE PRESSURE PRODUCT: NORMAL
REASON FOR TERMINATION: NORMAL
SL CV STRESS RECOVERY BP: NORMAL MMHG
SL CV STRESS RECOVERY HR: 102 BPM
SL CV STRESS RECOVERY O2 SAT: 97 %
SL CV STRESS STAGE REACHED: 3
STRESS ANGINA INDEX: 0
STRESS BASELINE BP: NORMAL MMHG
STRESS BASELINE HR: 73 BPM
STRESS DUKE TREADMILL SCORE: 9
STRESS O2 SAT REST: 96 %
STRESS PEAK HR: 148 BPM
STRESS POST ESTIMATED WORKLOAD: 10.1 METS
STRESS POST EXERCISE DUR MIN: 9 MIN
STRESS POST EXERCISE DUR MIN: 9 MIN
STRESS POST EXERCISE DUR SEC: 0 SEC
STRESS POST O2 SAT PEAK: 98 %
STRESS POST PEAK BP: 184 MMHG
STRESS POST PEAK HR: 148 BPM
STRESS POST PEAK SYSTOLIC BP: 184 MMHG
STRESS ST DEPRESSION: 0 MM
TARGET HR FORMULA: NORMAL
TEST INDICATION: NORMAL

## 2024-05-10 PROCEDURE — 93017 CV STRESS TEST TRACING ONLY: CPT

## 2024-05-10 PROCEDURE — 93971 EXTREMITY STUDY: CPT

## 2024-05-10 PROCEDURE — 93018 CV STRESS TEST I&R ONLY: CPT | Performed by: INTERNAL MEDICINE

## 2024-05-10 PROCEDURE — 93016 CV STRESS TEST SUPVJ ONLY: CPT | Performed by: INTERNAL MEDICINE

## 2024-05-10 PROCEDURE — 73562 X-RAY EXAM OF KNEE 3: CPT

## 2024-05-10 PROCEDURE — 93971 EXTREMITY STUDY: CPT | Performed by: SURGERY

## 2024-05-14 LAB
ARRHY DURING EX: NORMAL
CHEST PAIN STATEMENT: NORMAL
MAX DIASTOLIC BP: 80 MMHG
MAX PREDICTED HEART RATE: 166 BPM
PROTOCOL NAME: NORMAL
REASON FOR TERMINATION: NORMAL
STRESS POST EXERCISE DUR MIN: 9 MIN
STRESS POST EXERCISE DUR SEC: 0 SEC
STRESS POST PEAK HR: 148 BPM
STRESS POST PEAK SYSTOLIC BP: 184 MMHG
TARGET HR FORMULA: NORMAL
TEST INDICATION: NORMAL